# Patient Record
Sex: FEMALE | Race: WHITE | NOT HISPANIC OR LATINO | Employment: FULL TIME | URBAN - METROPOLITAN AREA
[De-identification: names, ages, dates, MRNs, and addresses within clinical notes are randomized per-mention and may not be internally consistent; named-entity substitution may affect disease eponyms.]

---

## 2017-07-18 ENCOUNTER — OFFICE VISIT (OUTPATIENT)
Dept: RHEUMATOLOGY | Facility: CLINIC | Age: 52
End: 2017-07-18
Payer: COMMERCIAL

## 2017-07-18 VITALS
WEIGHT: 234.69 LBS | HEIGHT: 65 IN | BODY MASS INDEX: 39.1 KG/M2 | SYSTOLIC BLOOD PRESSURE: 128 MMHG | DIASTOLIC BLOOD PRESSURE: 84 MMHG

## 2017-07-18 DIAGNOSIS — M35.01 SJOGREN'S SYNDROME WITH KERATOCONJUNCTIVITIS SICCA: Primary | ICD-10-CM

## 2017-07-18 PROCEDURE — 99213 OFFICE O/P EST LOW 20 MIN: CPT | Mod: ,,, | Performed by: INTERNAL MEDICINE

## 2017-07-18 RX ORDER — ERGOCALCIFEROL 1.25 MG/1
1000 CAPSULE ORAL DAILY
Status: ON HOLD | COMMUNITY
End: 2020-11-30

## 2017-07-18 RX ORDER — CEVIMELINE HYDROCHLORIDE 30 MG/1
30 CAPSULE ORAL DAILY
Refills: 5 | COMMUNITY
Start: 2017-06-30 | End: 2018-02-19

## 2017-07-18 NOTE — PROGRESS NOTES
"       [facility] RHEUMATOLOGY            PROGRESS NOTE      Subjective:       Patient ID:   NAME: Rhiannon Rangel : 1965     52 y.o. female    Referring Doc: No ref. provider found  Other Physicians:    Chief Complaint:  sjorens syndrome      History of Present Illness:     Patient returns today for a regularly scheduled follow-up visit for  Sjogren's syndrome     The patient is doing well except for severe dryness of her eyes and dryness of her mouth. She has tried eyedrops and Evoxac with only mild relief. She has also tried tear ducts plugs again with minimal improvement.  No rashes, no mucosal ulcerations, no chest pains cough or shortness of breath  No paresthesias  or joint swelling              ROS:   GEN:  No  fever, night sweats . weight is stable   No fatigue  SKIN: no rashes, no bruising, no ulcerations, no Raynaud's  HEENT: no HA's, No visual changes, no mucosal ulcers, ++sicca symptoms,  CV:   no CP, SOB, PND, BATISTA, no orthopnea, no palpitations  PULM: normal with no SOB, cough, hemoptysis, sputum or pleuritic pain  GI:  no abdominal pain, nausea, vomiting, constipation, diarrhea, melanotic stools, BRBPR, hematemesis, no dysphagia  :   no dysuria  NEURO: no paresthesias, headaches, visual disturbances, muscle weakness  MUSCULOSKELETAL:no joint swelling, prolonged AM stiffness, no back pain, no muscle pain  Allergies:  Review of patient's allergies indicates:  No Known Allergies    Medications:    Current Outpatient Prescriptions:     cevimeline (EVOXAC) 30 mg capsule, Take 30 mg by mouth once daily., Disp: , Rfl: 5    ergocalciferol (ERGOCALCIFEROL) 50,000 unit Cap, Take by mouth., Disp: , Rfl:     pumpkin seed extract-soy germ (AZO BLADDER CONTROL) 300 mg Cap, Take 300 mg by mouth once daily., Disp: , Rfl:     PMHx/PSHx Updates:            Objective:     Vitals:  Blood pressure 128/84, height 5' 5" (1.651 m), weight 106.5 kg (234 lb 11.2 oz).    Physical Examination:   GEN: no apparent " distress, comfortable; AAOx3  SKIN: no rashes,no ulceration, no Raynaud's, no petechiae, no SQ nodules,  HEAD: normal  EYES: no pallor, no icterus,   ENT:  ,+ mucosal dryness, no ulcerations  NECK: no masses, thyroid normal, trachea midline, no LAD/LN's, supple  CV: RRR with no murmur; l S1 and S2 reg. ,no gallop no rubs,   CHEST: Normal respiratory effort; CTAB; normal breath sounds; no wheeze or crackles  ABDOM: nontender and nondistended; soft; no masses; no rebound/guarding  MUSC/Skeletal: ROM normal; no crepitus; joints without synovitis,  no deformities  No joint swelling or tenderness of PIP, MCP, wrist, elbow, shoulder, or knee joints  EXTREM: no clubbing, cyanosis, no edema,normal  pulses   NEURO: grossly intact; motor WNL; AAOx3; ,   PSYCH: normal mood, affect and behavior  LYMPH: normal cervical, supraclavicular          Labs:   No results found for: WBC, HGB, HCT, MCV, PLT CMP  @LASTLAB(NA,K,CL,CO2,GLU,BUN,Creatinine,Calcium,PROT,Albumin,Bilitot,Alkphos,AST,ALT,CRP,ESR,RF,CCP,MAIRA,SSA,CPK,uric acid) )@  I have reviewed all available lab results and radiology reports.    Radiology/Diagnostic Studies:        Assessment/Plan:   (1) 52 y.o. female with diagnosis of Sjogren's with severe sicca sxs.  Try Aquoral  See dentist a few times a yr  Request results of recent labs done by PCP                Discussion:     I have explained all of the above in detail and the patient understands all of the current recommendation(s). I have answered all questions to the best of my ability and to their complete satisfaction.       The patient is to continue with the current management plan         RTC in   3-4 months      Electronically signed by Michelle Mendez MD

## 2018-02-19 ENCOUNTER — OFFICE VISIT (OUTPATIENT)
Dept: RHEUMATOLOGY | Facility: CLINIC | Age: 53
End: 2018-02-19
Payer: COMMERCIAL

## 2018-02-19 VITALS
DIASTOLIC BLOOD PRESSURE: 84 MMHG | SYSTOLIC BLOOD PRESSURE: 134 MMHG | HEIGHT: 65 IN | BODY MASS INDEX: 39.82 KG/M2 | WEIGHT: 239 LBS

## 2018-02-19 DIAGNOSIS — M35.01 SJOGREN'S SYNDROME WITH KERATOCONJUNCTIVITIS SICCA: Primary | ICD-10-CM

## 2018-02-19 PROCEDURE — 99213 OFFICE O/P EST LOW 20 MIN: CPT | Mod: ,,, | Performed by: INTERNAL MEDICINE

## 2018-02-19 PROCEDURE — 3008F BODY MASS INDEX DOCD: CPT | Mod: ,,, | Performed by: INTERNAL MEDICINE

## 2018-02-19 RX ORDER — DICLOFENAC SODIUM 10 MG/G
2 GEL TOPICAL 4 TIMES DAILY
Qty: 1 TUBE | Refills: 3 | Status: SHIPPED | OUTPATIENT
Start: 2018-02-19 | End: 2019-06-04 | Stop reason: SDUPTHER

## 2018-02-19 RX ORDER — ESCITALOPRAM OXALATE 20 MG/1
10 TABLET ORAL DAILY
Refills: 4 | COMMUNITY
Start: 2018-02-08

## 2018-02-19 NOTE — PROGRESS NOTES
"       [facility] RHEUMATOLOGY            PROGRESS NOTE      Subjective:       Patient ID:   NAME: Rhiannon Rangel : 1965     52 y.o. female    Referring Doc: No ref. provider found  Other Physicians:    Chief Complaint:  No chief complaint on file.      History of Present Illness:     Patient returns today for a regularly scheduled follow-up visit for  Sjogren's syndrome.     The patient is doing well. No chest pains cough or shortness of breath. No skin rashes. No mucosal ulcerations. No paresthesias. No joint swelling but some pain on the left knee after walking. Pain is in the medial aspect of the knee. No joint swelling.            ROS:   GEN:  No  fever, night sweats . weight is stable   No fatigue  SKIN: no rashes, no bruising, no ulcerations, no Raynaud's  HEENT: no HA's, No visual changes, no mucosal ulcers, + sicca symptoms,  CV:   no CP, SOB, PND, BATISTA, no orthopnea, no palpitations  PULM: normal with no SOB, cough, hemoptysis, sputum or pleuritic pain  GI:  no abdominal pain, nausea, vomiting, constipation, diarrhea, melanotic stools, BRBPR, hematemesis, no dysphagia  :   no dysuria  NEURO: no paresthesias, headaches, visual disturbances, muscle weakness  MUSCULOSKELETAL:no joint swelling, prolonged AM stiffness, no back pain, no muscle pain  Allergies:  Review of patient's allergies indicates:  No Known Allergies    Medications:    Current Outpatient Prescriptions:     ergocalciferol (ERGOCALCIFEROL) 50,000 unit Cap, Take by mouth., Disp: , Rfl:     escitalopram oxalate (LEXAPRO) 20 MG tablet, , Disp: , Rfl: 4    PILOCARPINE HCL (SALAGEN, PILOCARPINE, ORAL), Take by mouth., Disp: , Rfl:     diclofenac sodium 1 % Gel, Apply 2 g topically 4 (four) times daily. Apply 4 gm on affected knee tid prn, Disp: 1 Tube, Rfl: 3    PMHx/PSHx Updates:    Objective:     Vitals:  Blood pressure 134/84, height 5' 5" (1.651 m), weight 108.4 kg (239 lb).    Physical Examination:   GEN: no apparent distress, " comfortable; AAOx3  SKIN: no rashes,no ulceration, no Raynaud's, no petechiae, no SQ nodules,  HEAD: normal  EYES: no pallor, no icterus,  NECK: no masses, thyroid normal, trachea midline, no LAD/LN's, supple  CV: RRR with no murmur; l S1 and S2 reg. ,no gallop no rubs,   CHEST: Normal respiratory effort; CTAB; normal breath sounds; no wheeze or crackles  MUSC/Skeletal: ROM normal; no crepitus; joints without synovitis,  no deformities  No joint swelling or tenderness of PIP, MCP, wrist, elbow, shoulder, or knee joints. Tender left anserine bursa and infrapatellar tendon.  EXTREM: no clubbing, cyanosis, no edema,normal  pulses   NEURO: grossly intact; motor WNL; AAOx3;   PSYCH: normal mood, affect and behavior  LYMPH: normal cervical, supraclavicular          Labs:   No results found for: WBC, HGB, HCT, MCV, PLT CMP  @LASTLAB(NA,K,CL,CO2,GLU,BUN,Creatinine,Calcium,PROT,Albumin,Bilitot,Alkphos,AST,ALT,CRP,ESR,RF,CCP,MAIRA,SSA,CPK,uric acid) )@  I have reviewed all available lab results and radiology reports.    Radiology/Diagnostic Studies:        Assessment/Plan:   (1) 52 y.o. female with diagnosis of Sjogren's syndrome. This is stable.  2) mild left anserine bursitis and patellar tendinitis. She will use Voltaren gel as needed and Advil up to 800 mg 3 times a day after meals when necessary. She will call in a week or so for injection if not better.                Discussion:     I have explained all of the above in detail and the patient understands all of the current recommendation(s). I have answered all questions to the best of my ability and to their complete satisfaction.       The patient is to continue with the current management plan         RTC in   4 months or before if needed      Electronically signed by Michelle Mendez MD

## 2018-06-05 LAB
ALBUMIN SERPL-MCNC: 4.2 G/DL (ref 3.6–5.1)
ALBUMIN/GLOB SERPL: 1.6 (CALC) (ref 1–2.5)
ALP SERPL-CCNC: 137 U/L (ref 33–130)
ALT SERPL-CCNC: 17 U/L (ref 6–29)
AST SERPL-CCNC: 17 U/L (ref 10–35)
BASOPHILS # BLD AUTO: 29 CELLS/UL (ref 0–200)
BASOPHILS NFR BLD AUTO: 0.5 %
BILIRUB SERPL-MCNC: 0.6 MG/DL (ref 0.2–1.2)
BUN SERPL-MCNC: 20 MG/DL (ref 7–25)
BUN/CREAT SERPL: ABNORMAL (CALC) (ref 6–22)
CALCIUM SERPL-MCNC: 9.2 MG/DL (ref 8.6–10.4)
CHLORIDE SERPL-SCNC: 105 MMOL/L (ref 98–110)
CO2 SERPL-SCNC: 26 MMOL/L (ref 20–31)
COPY(IES) SENT TO:: NORMAL
CREAT SERPL-MCNC: 0.62 MG/DL (ref 0.5–1.05)
CRP SERPL-MCNC: 20.6 MG/L
EOSINOPHIL # BLD AUTO: 58 CELLS/UL (ref 15–500)
EOSINOPHIL NFR BLD AUTO: 1 %
ERYTHROCYTE [DISTWIDTH] IN BLOOD BY AUTOMATED COUNT: 14.1 % (ref 11–15)
GFR SERPL CREATININE-BSD FRML MDRD: 103 ML/MIN/1.73M2
GLOBULIN SER CALC-MCNC: 2.6 G/DL (CALC) (ref 1.9–3.7)
GLUCOSE SERPL-MCNC: 109 MG/DL (ref 65–99)
HCT VFR BLD AUTO: 38.9 % (ref 35–45)
HGB BLD-MCNC: 12.8 G/DL (ref 11.7–15.5)
LYMPHOCYTES # BLD AUTO: 1972 CELLS/UL (ref 850–3900)
LYMPHOCYTES NFR BLD AUTO: 34 %
MCH RBC QN AUTO: 27.9 PG (ref 27–33)
MCHC RBC AUTO-ENTMCNC: 32.9 G/DL (ref 32–36)
MCV RBC AUTO: 84.7 FL (ref 80–100)
MONOCYTES # BLD AUTO: 383 CELLS/UL (ref 200–950)
MONOCYTES NFR BLD AUTO: 6.6 %
NEUTROPHILS # BLD AUTO: 3358 CELLS/UL (ref 1500–7800)
NEUTROPHILS NFR BLD AUTO: 57.9 %
PLATELET # BLD AUTO: 282 THOUSAND/UL (ref 140–400)
PMV BLD REES-ECKER: 9.5 FL (ref 7.5–12.5)
POTASSIUM SERPL-SCNC: 4.1 MMOL/L (ref 3.5–5.3)
PROT SERPL-MCNC: 6.8 G/DL (ref 6.1–8.1)
RBC # BLD AUTO: 4.59 MILLION/UL (ref 3.8–5.1)
SODIUM SERPL-SCNC: 139 MMOL/L (ref 135–146)
WBC # BLD AUTO: 5.8 THOUSAND/UL (ref 3.8–10.8)

## 2018-10-01 ENCOUNTER — OFFICE VISIT (OUTPATIENT)
Dept: RHEUMATOLOGY | Facility: CLINIC | Age: 53
End: 2018-10-01
Payer: COMMERCIAL

## 2018-10-01 VITALS
BODY MASS INDEX: 40.47 KG/M2 | SYSTOLIC BLOOD PRESSURE: 94 MMHG | HEART RATE: 76 BPM | WEIGHT: 243.19 LBS | DIASTOLIC BLOOD PRESSURE: 69 MMHG

## 2018-10-01 DIAGNOSIS — M35.01 SJOGREN'S SYNDROME WITH KERATOCONJUNCTIVITIS SICCA: Primary | ICD-10-CM

## 2018-10-01 PROCEDURE — 99213 OFFICE O/P EST LOW 20 MIN: CPT | Mod: ,,, | Performed by: INTERNAL MEDICINE

## 2018-10-01 PROCEDURE — 3008F BODY MASS INDEX DOCD: CPT | Mod: ,,, | Performed by: INTERNAL MEDICINE

## 2018-10-01 RX ORDER — AMLODIPINE BESYLATE 10 MG/1
10 TABLET ORAL DAILY
Refills: 0 | COMMUNITY
Start: 2018-09-20 | End: 2021-05-27

## 2018-10-01 NOTE — PROGRESS NOTES
[facility] RHEUMATOLOGY            PROGRESS NOTE      Subjective:       Patient ID:   NAME: Rhiannon Rangel : 1965     53 y.o. female    Referring Doc: No ref. provider found  Other Physicians:    Chief Complaint:  Sjogren's syndrome      History of Present Illness:     Patient returns today for a regularly scheduled follow-up visit for Sjogren's syndrome      The patientis doing well. Occasional fatigue and arthralgias. No joint swelling. No rashes. No fevers. No cough or shortness of breath.  o chest pains          ROS:   GEN:  No  fever, night sweats . weight is stable   No fatigue  SKIN: no rashes, no bruising, no ulcerations, no Raynaud's  HEENT: no HA's, No visual changes, no mucosal ulcers, no sicca symptoms,  CV:   no CP, SOB, PND, BATISTA, no orthopnea, no palpitations  PULM: normal with no SOB, cough, hemoptysis, sputum or pleuritic pain  GI:  no abdominal pain, nausea, vomiting, constipation, diarrhea, melanotic stools, BRBPR, hematemesis, no dysphagia  :   no dysuria  NEURO: no paresthesias, headaches, visual disturbances, muscle weakness  MUSCULOSKELETAL:no joint swelling, prolonged AM stiffness, no back pain, + muscle pain  Allergies:  Review of patient's allergies indicates:  No Known Allergies    Medications:    Current Outpatient Medications:     amLODIPine (NORVASC) 10 MG tablet, , Disp: , Rfl: 0    ergocalciferol (ERGOCALCIFEROL) 50,000 unit Cap, Take by mouth., Disp: , Rfl:     escitalopram oxalate (LEXAPRO) 20 MG tablet, , Disp: , Rfl: 4    PILOCARPINE HCL (SALAGEN, PILOCARPINE, ORAL), Take by mouth., Disp: , Rfl:     diclofenac sodium 1 % Gel, Apply 2 g topically 4 (four) times daily. Apply 4 gm on affected knee tid prn, Disp: 1 Tube, Rfl: 3    PMHx/PSHx Updates:        Objective:     Vitals:  Blood pressure 94/69, pulse 76, weight 110.3 kg (243 lb 3.2 oz).    Physical Examination:   GEN: no apparent distress, comfortable; AAOx3  SKIN: no rashes,no ulceration, no Raynaud's, no  petechiae, no SQ nodules,  HEAD: normal  EYES: no pallor, no icterus  NECK: no masses, thyroid normal, trachea midline, no LAD/LN's, supple  CV: RRR with no murmur; l S1 and S2 reg. ,no gallop no rubs,   CHEST: Normal respiratory effort; CTAB; normal breath sounds; no wheeze or crackles  MUSC/Skeletal: ROM normal; no crepitus; joints without synovitis,  no deformities  No joint swelling or tenderness of PIP, MCP, wrist, elbow, shoulder, or knee joints  EXTREM: no clubbing, cyanosis, no edema,normal  pulses   NEURO: grossly intact; motor WNL; AAOx3; ,   PSYCH: normal mood, affect and behavior  LYMPH: normal cervical, supraclavicular          Labs:   Lab Results   Component Value Date    WBC 5.8 06/04/2018    HGB 12.8 06/04/2018    HCT 38.9 06/04/2018    MCV 84.7 06/04/2018     06/04/2018    CMP  @LASTLAB(NA,K,CL,CO2,GLU,BUN,Creatinine,Calcium,PROT,Albumin,Bilitot,Alkphos,AST,ALT,CRP,ESR,RF,CCP,MAIRA,SSA,CPK,uric acid) )@  I have reviewed all available lab results and radiology reports.    Radiology/Diagnostic Studies:    CBC CMP within normal limits C-reactive protein elevated back in June.    Assessment/Plan:   (1) 53 y.o. female with diagnosis of Sjogren's syndrome        CBC CMP CRP urinalysis        Discussion:     I have explained all of the above in detail and the patient understands all of the current recommendation(s). I have answered all questions to the best of my ability and to their complete satisfaction.       The patient is to continue with the current management plan         RTC in 4 months or before if needed        Electronically signed by Michelle Mendez MD

## 2018-10-03 LAB
ALBUMIN SERPL-MCNC: 4 G/DL (ref 3.6–5.1)
ALBUMIN/GLOB SERPL: 1.7 (CALC) (ref 1–2.5)
ALP SERPL-CCNC: 139 U/L (ref 33–130)
ALT SERPL-CCNC: 26 U/L (ref 6–29)
APPEARANCE UR: CLEAR
AST SERPL-CCNC: 17 U/L (ref 10–35)
BACTERIA #/AREA URNS HPF: ABNORMAL /HPF
BACTERIA UR CULT: NORMAL
BASOPHILS # BLD AUTO: 28 CELLS/UL (ref 0–200)
BASOPHILS NFR BLD AUTO: 0.4 %
BILIRUB SERPL-MCNC: 0.4 MG/DL (ref 0.2–1.2)
BILIRUB UR QL STRIP: NEGATIVE
BUN SERPL-MCNC: 20 MG/DL (ref 7–25)
BUN/CREAT SERPL: ABNORMAL (CALC) (ref 6–22)
CALCIUM SERPL-MCNC: 8.8 MG/DL (ref 8.6–10.4)
CHLORIDE SERPL-SCNC: 105 MMOL/L (ref 98–110)
CO2 SERPL-SCNC: 28 MMOL/L (ref 20–32)
COLOR UR: YELLOW
CREAT SERPL-MCNC: 0.62 MG/DL (ref 0.5–1.05)
CRP SERPL-MCNC: 19.3 MG/L
EOSINOPHIL # BLD AUTO: 71 CELLS/UL (ref 15–500)
EOSINOPHIL NFR BLD AUTO: 1 %
ERYTHROCYTE [DISTWIDTH] IN BLOOD BY AUTOMATED COUNT: 14.6 % (ref 11–15)
GFR SERPL CREATININE-BSD FRML MDRD: 102 ML/MIN/1.73M2
GLOBULIN SER CALC-MCNC: 2.4 G/DL (CALC) (ref 1.9–3.7)
GLUCOSE SERPL-MCNC: 133 MG/DL (ref 65–139)
GLUCOSE UR QL STRIP: NEGATIVE
HCT VFR BLD AUTO: 35.6 % (ref 35–45)
HGB BLD-MCNC: 12 G/DL (ref 11.7–15.5)
HGB UR QL STRIP: NEGATIVE
HYALINE CASTS #/AREA URNS LPF: ABNORMAL /LPF
KETONES UR QL STRIP: ABNORMAL
LEUKOCYTE ESTERASE UR QL STRIP: NEGATIVE
LYMPHOCYTES # BLD AUTO: 2080 CELLS/UL (ref 850–3900)
LYMPHOCYTES NFR BLD AUTO: 29.3 %
MCH RBC QN AUTO: 28.7 PG (ref 27–33)
MCHC RBC AUTO-ENTMCNC: 33.7 G/DL (ref 32–36)
MCV RBC AUTO: 85.2 FL (ref 80–100)
MONOCYTES # BLD AUTO: 348 CELLS/UL (ref 200–950)
MONOCYTES NFR BLD AUTO: 4.9 %
NEUTROPHILS # BLD AUTO: 4572 CELLS/UL (ref 1500–7800)
NEUTROPHILS NFR BLD AUTO: 64.4 %
NITRITE UR QL STRIP: NEGATIVE
PH UR STRIP: 7.5 [PH] (ref 5–8)
PLATELET # BLD AUTO: 270 THOUSAND/UL (ref 140–400)
PMV BLD REES-ECKER: 10.3 FL (ref 7.5–12.5)
POTASSIUM SERPL-SCNC: 4.1 MMOL/L (ref 3.5–5.3)
PROT SERPL-MCNC: 6.4 G/DL (ref 6.1–8.1)
PROT UR QL STRIP: NEGATIVE
RBC # BLD AUTO: 4.18 MILLION/UL (ref 3.8–5.1)
RBC #/AREA URNS HPF: ABNORMAL /HPF
SODIUM SERPL-SCNC: 141 MMOL/L (ref 135–146)
SP GR UR STRIP: 1.02 (ref 1–1.03)
SQUAMOUS #/AREA URNS HPF: ABNORMAL /HPF
WBC # BLD AUTO: 7.1 THOUSAND/UL (ref 3.8–10.8)
WBC #/AREA URNS HPF: ABNORMAL /HPF

## 2018-10-04 DIAGNOSIS — R74.8 ELEVATED ALKALINE PHOSPHATASE LEVEL: Primary | ICD-10-CM

## 2018-10-04 NOTE — PROGRESS NOTES
Patient notified of result and need to do alk phos with isoenzymes. Lab ordered thru [facility] . Order faxed to Dr. Lock in [place]

## 2018-10-09 LAB
ALP BONE CFR SERPL: 46 % (ref 28–66)
ALP INTEST CFR SERPL: 0 % (ref 1–24)
ALP LIVER CFR SERPL: 54 % (ref 25–69)
ALP PLAC CFR SERPL: 0 %
ALP SERPL-CCNC: 142 U/L (ref 33–130)

## 2019-06-04 ENCOUNTER — CLINICAL SUPPORT (OUTPATIENT)
Dept: INTERNAL MEDICINE | Facility: CLINIC | Age: 54
End: 2019-06-04
Payer: COMMERCIAL

## 2019-06-04 ENCOUNTER — OFFICE VISIT (OUTPATIENT)
Dept: RHEUMATOLOGY | Facility: CLINIC | Age: 54
End: 2019-06-04
Payer: COMMERCIAL

## 2019-06-04 VITALS
BODY MASS INDEX: 40.97 KG/M2 | SYSTOLIC BLOOD PRESSURE: 137 MMHG | WEIGHT: 246.19 LBS | DIASTOLIC BLOOD PRESSURE: 84 MMHG

## 2019-06-04 VITALS
WEIGHT: 246 LBS | BODY MASS INDEX: 40.98 KG/M2 | HEART RATE: 77 BPM | SYSTOLIC BLOOD PRESSURE: 136 MMHG | HEIGHT: 65 IN | DIASTOLIC BLOOD PRESSURE: 88 MMHG | OXYGEN SATURATION: 98 %

## 2019-06-04 DIAGNOSIS — Z02.89 ENCOUNTER FOR EXAMINATION REQUIRED BY DEPARTMENT OF TRANSPORTATION (DOT): Primary | ICD-10-CM

## 2019-06-04 DIAGNOSIS — M15.9 OSTEOARTHRITIS, GENERALIZED: Primary | ICD-10-CM

## 2019-06-04 LAB
BILIRUB SERPL-MCNC: ABNORMAL MG/DL
BLOOD, POC UA: ABNORMAL
GLUCOSE UR QL STRIP: ABNORMAL
KETONES UR QL STRIP: ABNORMAL
LEUKOCYTE ESTERASE URINE, POC: ABNORMAL
NITRITE, POC UA: ABNORMAL
PH, POC UA: ABNORMAL
PROTEIN, POC: ABNORMAL
SPECIFIC GRAVITY, POC UA: 1.02
UROBILINOGEN, POC UA: ABNORMAL

## 2019-06-04 PROCEDURE — 81000 POCT URINALYSIS: ICD-10-PCS | Mod: ,,, | Performed by: NURSE PRACTITIONER

## 2019-06-04 PROCEDURE — 99499 UNLISTED E&M SERVICE: CPT | Mod: ,,, | Performed by: NURSE PRACTITIONER

## 2019-06-04 PROCEDURE — 3008F BODY MASS INDEX DOCD: CPT | Mod: ,,, | Performed by: INTERNAL MEDICINE

## 2019-06-04 PROCEDURE — 99213 OFFICE O/P EST LOW 20 MIN: CPT | Mod: ,,, | Performed by: INTERNAL MEDICINE

## 2019-06-04 PROCEDURE — 81000 URINALYSIS NONAUTO W/SCOPE: CPT | Mod: ,,, | Performed by: NURSE PRACTITIONER

## 2019-06-04 PROCEDURE — 99213 PR OFFICE/OUTPT VISIT, EST, LEVL III, 20-29 MIN: ICD-10-PCS | Mod: ,,, | Performed by: INTERNAL MEDICINE

## 2019-06-04 PROCEDURE — 99499 PR PHYSICAL - DOT/CDL: ICD-10-PCS | Mod: ,,, | Performed by: NURSE PRACTITIONER

## 2019-06-04 PROCEDURE — 3008F PR BODY MASS INDEX (BMI) DOCUMENTED: ICD-10-PCS | Mod: ,,, | Performed by: INTERNAL MEDICINE

## 2019-06-04 RX ORDER — CHOLECALCIFEROL (VITAMIN D3) 25 MCG
TABLET ORAL
COMMUNITY

## 2019-06-04 RX ORDER — CEVIMELINE HYDROCHLORIDE 30 MG/1
30 CAPSULE ORAL 4 TIMES DAILY
Refills: 1 | COMMUNITY
Start: 2019-04-09

## 2019-06-04 RX ORDER — OMEPRAZOLE 20 MG/1
20 CAPSULE, DELAYED RELEASE ORAL DAILY
Refills: 2 | COMMUNITY
Start: 2019-05-12

## 2019-06-04 RX ORDER — CYCLOSPORINE 0.5 MG/ML
EMULSION OPHTHALMIC
Refills: 3 | COMMUNITY
Start: 2019-04-08

## 2019-06-04 RX ORDER — DICLOFENAC SODIUM 10 MG/G
2 GEL TOPICAL 4 TIMES DAILY
Qty: 1 TUBE | Refills: 3 | Status: SHIPPED | OUTPATIENT
Start: 2019-06-04 | End: 2021-07-06

## 2019-06-04 NOTE — PROGRESS NOTES
Subjective:       Patient ID: Rhiannon Rangel is a 53 y.o. female.    Chief Complaint: Employment Physical (dot physical)    Rhiannon Rangel is a 53 year old female who presents to the clinic today for a DOT exam. She is a . Today, she voices no questions/concerns. She denies tobacco use, alcohol or drug use. Medications reviewed with patient. Sees Dr Lock for PCP. Reports hx of hypertension. In regards to the patient's hypertension, patient denies chest pain/sob, and has been compliant with the medication regimen. Bp well controlled. Goal < 140/90 per DOT guidelines.  Denies cp, sob, n/v/d, headache or any pain today.       Review of Systems   Constitutional: Negative for activity change, chills, fatigue, fever and unexpected weight change.   HENT: Negative for congestion, ear pain, postnasal drip, sinus pressure, sneezing, sore throat and tinnitus.    Eyes: Negative for pain, redness and visual disturbance.        Wears glasses  Eye exam UTD   Respiratory: Negative for apnea, cough, chest tightness, shortness of breath and wheezing.    Cardiovascular: Negative for chest pain, palpitations and leg swelling.   Gastrointestinal: Negative for abdominal distention, abdominal pain, blood in stool, constipation, diarrhea, nausea and vomiting.   Endocrine: Negative for polydipsia, polyphagia and polyuria.   Genitourinary: Negative for difficulty urinating, dysuria, flank pain, frequency, hematuria and urgency.   Musculoskeletal: Negative for arthralgias, back pain, joint swelling and myalgias.   Skin: Negative for color change, pallor and rash.   Allergic/Immunologic: Negative for environmental allergies, food allergies and immunocompromised state.   Neurological: Negative for dizziness, tremors, syncope, weakness, light-headedness and headaches.   Hematological: Negative for adenopathy. Does not bruise/bleed easily.   Psychiatric/Behavioral: Negative for agitation, confusion, decreased  concentration, self-injury, sleep disturbance and suicidal ideas. The patient is not nervous/anxious and is not hyperactive.          Reviewed family, medical, surgical, and social history.    Objective:      There were no vitals taken for this visit.  Physical Exam   Constitutional: She is oriented to person, place, and time. She appears well-developed and well-nourished. She is active and cooperative. No distress.   HENT:   Head: Normocephalic and atraumatic.   Right Ear: Hearing, external ear and ear canal normal. No drainage. A middle ear effusion is present. No decreased hearing is noted.   Left Ear: Hearing, external ear and ear canal normal. No drainage. A middle ear effusion is present. No decreased hearing is noted.   Nose: Nose normal. No mucosal edema, rhinorrhea or nasal deformity. No epistaxis.   Mouth/Throat: Uvula is midline, oropharynx is clear and moist and mucous membranes are normal. No uvula swelling. No oropharyngeal exudate or posterior oropharyngeal erythema. No tonsillar exudate.   Eyes: Pupils are equal, round, and reactive to light. Conjunctivae, EOM and lids are normal. Right eye exhibits no discharge. Left eye exhibits no discharge.   Neck: Trachea normal and full passive range of motion without pain. No JVD present. No tracheal tenderness and no muscular tenderness present. Carotid bruit is not present. No edema and no erythema present. No thyroid mass and no thyromegaly present.   Cardiovascular: Normal rate, regular rhythm, S1 normal, S2 normal, normal heart sounds, intact distal pulses and normal pulses.   Pulmonary/Chest: Effort normal and breath sounds normal. No stridor. No tachypnea and no bradypnea. No respiratory distress. She has no decreased breath sounds. She has no wheezes. She has no rhonchi. She has no rales.   Abdominal: Soft. Normal appearance and bowel sounds are normal. She exhibits no distension and no abdominal bruit. There is no tenderness. There is no guarding and  no CVA tenderness.   Musculoskeletal: She exhibits no edema, tenderness or deformity.        Right foot: There is normal range of motion.        Left foot: There is normal range of motion.   Lymphadenopathy:     She has no cervical adenopathy.   Neurological: She is alert and oriented to person, place, and time. She has normal strength. She exhibits normal muscle tone.   Reflex Scores:       Patellar reflexes are 0 on the right side and 0 on the left side.  Skin: Skin is warm, dry and intact. Capillary refill takes less than 2 seconds. No abrasion, no laceration, no lesion and no rash noted. She is not diaphoretic. No cyanosis. Nails show no clubbing.   Psychiatric: She has a normal mood and affect. Her speech is normal and behavior is normal. Judgment and thought content normal. Cognition and memory are normal.       Assessment:       1. Encounter for examination required by Department of Transportation (DOT)        Plan:       Encounter for examination required by Department of Transportation (DOT)  -     POCT URINALYSIS          PLAN:  - Discussed with patient the plan of care  - UA, vision, hearing performed  - See scanned images  - Medications reviewed. Medication side effects discussed. Patient has no questions or concerns at this time. Informed patient to notify me regarding any concerns.   - Continue monitoring BP and documenting in log book   - Recommend to f/u regarding UA with PCP  - Informed patient to please notify me with any questions or concerns at anytime  - Follow up ordered for 1 Year      Risks, benefits, and side effects were discussed with the patient. All questions were answered to the fullest satisfaction of the patient, and pt verbalized understanding and agreement to treatment plan. Pt was to call with any new or worsening symptoms, or present to the ER.

## 2019-06-04 NOTE — PROGRESS NOTES
[facility] RHEUMATOLOGY            PROGRESS NOTE      Subjective:       Patient ID:   NAME: Rhiannon Rangel : 1965     53 y.o. female    Referring Doc: No ref. provider found  Other Physicians:    Chief Complaint:  Sjogren's syndrome (Needs refill on Diclofenac gel)      History of Present Illness:     Patient returns today for a regularly scheduled follow-up visit for Sjogren's syndrome    The patient continues with sicca  symptoms. No chest pains. Occasional nonproductive cough. No shortness of breath. No rashes, no paresthesias no joint swelling. No GI complaints          ROS:   GEN:  No  fever, night sweats . weight is stable   + occ fatigue  SKIN: no rashes, no bruising, no ulcerations, no Raynaud's  HEENT: no HA's, No visual changes, no mucosal ulcers, + sicca symptoms,  CV:   no CP, SOB, PND, BATISTA, no orthopnea, no palpitations  PULM: normal with no SOB, cough, hemoptysis, sputum or pleuritic pain  GI:  no abdominal pain, nausea, vomiting, constipation, diarrhea, melanotic stools, BRBPR, hematemesis, no dysphagia  :   no dysuria  NEURO: no paresthesias, headaches, visual disturbances, muscle weakness  MUSCULOSKELETAL:no joint swelling, prolonged AM stiffness, no back pain, no muscle pain  Allergies:  Review of patient's allergies indicates:  No Known Allergies    Medications:    Current Outpatient Medications:     amLODIPine (NORVASC) 10 MG tablet, , Disp: , Rfl: 0    cevimeline (EVOXAC) 30 mg capsule, TK 1 C PO TID, Disp: , Rfl: 1    diclofenac sodium (VOLTAREN) 1 % Gel, Apply 2 g topically 4 (four) times daily. Apply 4 gm on affected knee tid prn, Disp: 1 Tube, Rfl: 3    ergocalciferol (ERGOCALCIFEROL) 50,000 unit Cap, Take by mouth., Disp: , Rfl:     escitalopram oxalate (LEXAPRO) 20 MG tablet, , Disp: , Rfl: 4    omeprazole (PRILOSEC) 20 MG capsule, TK ONE C PO QD ONE HOUR BEFORE FIRST MEAL, Disp: , Rfl: 2    PILOCARPINE HCL (SALAGEN, PILOCARPINE, ORAL), Take by mouth., Disp: , Rfl:      RESTASIS 0.05 % ophthalmic emulsion, INT 1 GTT IN OU BID, Disp: , Rfl: 3    vitamin D (VITAMIN D3) 1000 units Tab, Vitamin D3 1,000 unit tablet  Take 1 tablet every day by oral route., Disp: , Rfl:     PMHx/PSHx Updates:        Objective:     Vitals:  Blood pressure 137/84, weight 111.7 kg (246 lb 3.2 oz).    Physical Examination:   GEN: no apparent distress, comfortable; AAOx3  SKIN: no rashes,no ulceration, no Raynaud's, no petechiae, no SQ nodules,  HEAD: normal  EYES: no pallor, no icterus,   NECK: no masses, thyroid normal, trachea midline, no LAD/LN's, supple  CV: RRR with no murmur; l S1 and S2 reg. ,no gallop no rubs,   CHEST: Normal respiratory effort; CTAB; normal breath sounds; no wheeze or crackles  MUSC/Skeletal: ROM normal; no crepitus; joints without synovitis,  no deformities  No joint swelling or tenderness of PIP, MCP, wrist, elbow, shoulder, or knee joints  EXTREM: no clubbing, cyanosis, no edema,normal  pulses   NEURO: grossly intact; motor WNL; AAOx3; ,   PSYCH: normal mood, affect and behavior  LYMPH: normal cervical, supraclavicular          Labs:   Lab Results   Component Value Date    WBC 7.1 10/02/2018    HGB 12.0 10/02/2018    HCT 35.6 10/02/2018    MCV 85.2 10/02/2018     10/02/2018    CMP  @LASTLAB(NA,K,CL,CO2,GLU,BUN,Creatinine,Calcium,PROT,Albumin,Bilitot,Alkphos,AST,ALT,CRP,ESR,RF,CCP,MAIRA,SSA,CPK,uric acid) )@  I have reviewed all available lab results and radiology reports.    Radiology/Diagnostic Studies:        Assessment/Plan:   (1) 53 y.o. female with diagnosis of Sjogren's syndrome. She has permanent tear duct.plugs. She uses Restasis and she is on Evoxac.  Despite all,  she still has significant dryness of her eyes. She has dryness of her mouth as well.She sees the dentist every 4 months and she is on Biotene mouthwashes , special tooth paste etc. No extraglandular manifestations of Sjogren's                Discussion:     I have explained all of the above in detail  and the patient understands all of the current recommendation(s). I have answered all questions to the best of my ability and to their complete satisfaction.       The patient is to continue with the current management plan         RTC in   4 months      Electronically signed by Michelle Mendez MD

## 2019-06-10 LAB
ALBUMIN SERPL-MCNC: 4.1 G/DL (ref 3.6–5.1)
ALBUMIN/GLOB SERPL: 1.8 (CALC) (ref 1–2.5)
ALP SERPL-CCNC: 157 U/L (ref 33–130)
ALT SERPL-CCNC: 19 U/L (ref 6–29)
APPEARANCE UR: CLEAR
AST SERPL-CCNC: 15 U/L (ref 10–35)
BACTERIA #/AREA URNS HPF: ABNORMAL /HPF
BACTERIA UR CULT: NORMAL
BASOPHILS # BLD AUTO: 33 CELLS/UL (ref 0–200)
BASOPHILS NFR BLD AUTO: 0.4 %
BILIRUB SERPL-MCNC: 0.3 MG/DL (ref 0.2–1.2)
BILIRUB UR QL STRIP: NEGATIVE
BUN SERPL-MCNC: 20 MG/DL (ref 7–25)
BUN/CREAT SERPL: ABNORMAL (CALC) (ref 6–22)
CALCIUM SERPL-MCNC: 9.2 MG/DL (ref 8.6–10.4)
CAOX CRY #/AREA URNS HPF: ABNORMAL /HPF
CHLORIDE SERPL-SCNC: 105 MMOL/L (ref 98–110)
CO2 SERPL-SCNC: 28 MMOL/L (ref 20–32)
COLOR UR: ABNORMAL
CREAT SERPL-MCNC: 0.65 MG/DL (ref 0.5–1.05)
CRP SERPL-MCNC: 17.8 MG/L
EOSINOPHIL # BLD AUTO: 82 CELLS/UL (ref 15–500)
EOSINOPHIL NFR BLD AUTO: 1 %
ERYTHROCYTE [DISTWIDTH] IN BLOOD BY AUTOMATED COUNT: 14.6 % (ref 11–15)
GFRSERPLBLD MDRD-ARVRAT: 101 ML/MIN/1.73M2
GLOBULIN SER CALC-MCNC: 2.3 G/DL (CALC) (ref 1.9–3.7)
GLUCOSE SERPL-MCNC: 95 MG/DL (ref 65–139)
GLUCOSE UR QL STRIP: NEGATIVE
GRAN CASTS #/AREA URNS LPF: ABNORMAL /LPF
HCT VFR BLD AUTO: 36.7 % (ref 35–45)
HGB BLD-MCNC: 12.2 G/DL (ref 11.7–15.5)
HGB UR QL STRIP: NEGATIVE
HYALINE CASTS #/AREA URNS LPF: ABNORMAL /LPF
KETONES UR QL STRIP: ABNORMAL
LEUKOCYTE ESTERASE UR QL STRIP: NEGATIVE
LYMPHOCYTES # BLD AUTO: 2091 CELLS/UL (ref 850–3900)
LYMPHOCYTES NFR BLD AUTO: 25.5 %
MCH RBC QN AUTO: 27.4 PG (ref 27–33)
MCHC RBC AUTO-ENTMCNC: 33.2 G/DL (ref 32–36)
MCV RBC AUTO: 82.3 FL (ref 80–100)
MONOCYTES # BLD AUTO: 525 CELLS/UL (ref 200–950)
MONOCYTES NFR BLD AUTO: 6.4 %
NEUTROPHILS # BLD AUTO: 5469 CELLS/UL (ref 1500–7800)
NEUTROPHILS NFR BLD AUTO: 66.7 %
NITRITE UR QL STRIP: NEGATIVE
PH UR STRIP: 5.5 [PH] (ref 5–8)
PLATELET # BLD AUTO: 299 THOUSAND/UL (ref 140–400)
PMV BLD REES-ECKER: 9.6 FL (ref 7.5–12.5)
POTASSIUM SERPL-SCNC: 4 MMOL/L (ref 3.5–5.3)
PROT SERPL-MCNC: 6.4 G/DL (ref 6.1–8.1)
PROT UR QL STRIP: NEGATIVE
RBC # BLD AUTO: 4.46 MILLION/UL (ref 3.8–5.1)
RBC #/AREA URNS HPF: ABNORMAL /HPF
SODIUM SERPL-SCNC: 141 MMOL/L (ref 135–146)
SP GR UR STRIP: 1.03 (ref 1–1.03)
SQUAMOUS #/AREA URNS HPF: ABNORMAL /HPF
WBC # BLD AUTO: 8.2 THOUSAND/UL (ref 3.8–10.8)
WBC #/AREA URNS HPF: ABNORMAL /HPF

## 2019-06-11 DIAGNOSIS — Z79.899 ENCOUNTER FOR LONG-TERM (CURRENT) USE OF MEDICATIONS: ICD-10-CM

## 2019-06-11 DIAGNOSIS — M15.9 OSTEOARTHRITIS, GENERALIZED: ICD-10-CM

## 2019-06-11 DIAGNOSIS — R74.8 ELEVATED ALKALINE PHOSPHATASE LEVEL: Primary | ICD-10-CM

## 2019-06-11 NOTE — PROGRESS NOTES
Patient notified of result and need for alk phos with isoenzymes test. Test ordered and faxed to Dr. Thomas office.

## 2019-06-22 LAB
ALP BONE CFR SERPL: 49 % (ref 28–66)
ALP INTEST CFR SERPL: 0 % (ref 1–24)
ALP LIVER CFR SERPL: 51 % (ref 25–69)
ALP PLAC CFR SERPL: 0 %
ALP SERPL-CCNC: 164 U/L (ref 33–130)

## 2019-10-09 ENCOUNTER — OFFICE VISIT (OUTPATIENT)
Dept: RHEUMATOLOGY | Facility: CLINIC | Age: 54
End: 2019-10-09
Payer: COMMERCIAL

## 2019-10-09 VITALS
BODY MASS INDEX: 40.82 KG/M2 | DIASTOLIC BLOOD PRESSURE: 89 MMHG | SYSTOLIC BLOOD PRESSURE: 136 MMHG | WEIGHT: 245.31 LBS

## 2019-10-09 DIAGNOSIS — R74.8 ELEVATED ALKALINE PHOSPHATASE LEVEL: Primary | ICD-10-CM

## 2019-10-09 DIAGNOSIS — M89.9 BONE DISEASE: ICD-10-CM

## 2019-10-09 PROCEDURE — 3008F PR BODY MASS INDEX (BMI) DOCUMENTED: ICD-10-PCS | Mod: S$GLB,,, | Performed by: INTERNAL MEDICINE

## 2019-10-09 PROCEDURE — 99213 OFFICE O/P EST LOW 20 MIN: CPT | Mod: S$GLB,,, | Performed by: INTERNAL MEDICINE

## 2019-10-09 PROCEDURE — 99213 PR OFFICE/OUTPT VISIT, EST, LEVL III, 20-29 MIN: ICD-10-PCS | Mod: S$GLB,,, | Performed by: INTERNAL MEDICINE

## 2019-10-09 PROCEDURE — 3008F BODY MASS INDEX DOCD: CPT | Mod: S$GLB,,, | Performed by: INTERNAL MEDICINE

## 2019-10-09 NOTE — PROGRESS NOTES
[facility] RHEUMATOLOGY            PROGRESS NOTE      Subjective:       Patient ID:   NAME: Rhiannon Rangel : 1965     54 y.o. female    Referring Doc: No ref. provider found  Other Physicians:    Chief Complaint:  Osteoarthritis      History of Present Illness:     Patient returns today for a regularly scheduled follow-up visit for  OA , Sjogren's    The patient is doing well.  No fevers, cough shortness of breath.  No rashes.  No paresthesias.  No GI complaints.  +Sicca          ROS:   GEN:  No  fever, night sweats . weight is stable   + fatigue  SKIN: no rashes, no bruising, no ulcerations, no Raynaud's  HEENT: no HA's, No visual changes, no mucosal ulcers, + sicca symptoms,  CV:   no CP, SOB, PND, BATISTA, no orthopnea, no palpitations  PULM: normal with no SOB, cough, hemoptysis, sputum or pleuritic pain  GI:  no abdominal pain, nausea, vomiting, constipation, diarrhea, melanotic stools, BRBPR, hematemesis, no dysphagia  :   no dysuria  NEURO: no paresthesias, headaches, visual disturbances, muscle weakness  MUSCULOSKELETAL:no joint swelling, prolonged AM stiffness, no back pain, no muscle pain  Allergies:  Review of patient's allergies indicates:  No Known Allergies    Medications:    Current Outpatient Medications:     amLODIPine (NORVASC) 10 MG tablet, , Disp: , Rfl: 0    cevimeline (EVOXAC) 30 mg capsule, TK 1 C PO TID, Disp: , Rfl: 1    diclofenac sodium (VOLTAREN) 1 % Gel, Apply 2 g topically 4 (four) times daily. Apply 4 gm on affected knee tid prn, Disp: 1 Tube, Rfl: 3    ergocalciferol (ERGOCALCIFEROL) 50,000 unit Cap, Take by mouth., Disp: , Rfl:     escitalopram oxalate (LEXAPRO) 20 MG tablet, , Disp: , Rfl: 4    omeprazole (PRILOSEC) 20 MG capsule, TK ONE C PO QD ONE HOUR BEFORE FIRST MEAL, Disp: , Rfl: 2    RESTASIS 0.05 % ophthalmic emulsion, INT 1 GTT IN OU BID, Disp: , Rfl: 3    vitamin D (VITAMIN D3) 1000 units Tab, Vitamin D3 1,000 unit tablet  Take 1 tablet every day by oral  route., Disp: , Rfl:     PMHx/PSHx Updates:          Objective:     Vitals:  Blood pressure 136/89, weight 111.3 kg (245 lb 4.8 oz).    Physical Examination:   GEN: no apparent distress, comfortable; AAOx3  SKIN: no rashes,no ulceration, no Raynaud's, no petechiae, no SQ nodules,  HEAD: normal  EYES: no pallor, no icterus, PERRLA  ENT:  ,no mucosal dryness or ulcerations  NECK: no masses, thyroid normal, trachea midline, no LAD/LN's, supple  CV: RRR with no murmur; l S1 and S2 reg. ,no gallop no rubs,   CHEST: Normal respiratory effort; CTAB; normal breath sounds; no wheeze or crackles  MUSC/Skeletal: ROM normal; no crepitus; joints without synovitis,  no deformities  No joint swelling or tenderness of PIP, MCP, wrist, elbow, shoulder, or knee joints  EXTREM: no clubbing, cyanosis, no edema,normal  pulses   NEURO: grossly intact; motor WNL; AAOx3; ,   PSYCH: normal mood, affect and behavior  LYMPH: normal cervical, supraclavicular          Labs:   Lab Results   Component Value Date    WBC 8.2 06/07/2019    HGB 12.2 06/07/2019    HCT 36.7 06/07/2019    MCV 82.3 06/07/2019     06/07/2019    CMP  @LASTLAB(NA,K,CL,CO2,GLU,BUN,Creatinine,Calcium,PROT,Albumin,Bilitot,Alkphos,AST,ALT,CRP,ESR,RF,CCP,MAIRA,SSA,CPK,uric acid) )@  I have reviewed all available lab results and radiology reports.    Radiology/Diagnostic Studies:        Assessment/Plan:   (1) 54 y.o. female with diagnosis of Sjogren's.  This is stable  Slight elevation of alkaline phosphatase with normal isoenzyme percentages   Plan: ultrasound of liver, if negative do total body bone scan      FU in 4 months  Had blood work with primary care yesterday.  She will request results be sent to me.        Discussion:     I have explained all of the above in detail and the patient understands all of the current recommendation(s). I have answered all questions to the best of my ability and to their complete satisfaction.       The patient is to continue with the  current management plan         RTC in         Electronically signed by Michelle Mendez MD

## 2019-10-30 ENCOUNTER — TELEPHONE (OUTPATIENT)
Dept: RHEUMATOLOGY | Facility: CLINIC | Age: 54
End: 2019-10-30

## 2019-10-30 NOTE — TELEPHONE ENCOUNTER
Patient notified of abdominal us results and instructed to fu with Dr. Lock. Patient verbalizes understanding of all.   result faxed to Dr. Lock.

## 2019-10-30 NOTE — TELEPHONE ENCOUNTER
----- Message from Michelle Mendez MD sent at 10/29/2019  9:53 AM CDT -----  US shows fatty infiltration of liver and ? r kidney changes.  Please send report to her PCP,Dr.D Lock in [place].FU with her

## 2020-03-13 DIAGNOSIS — R10.13 EPIGASTRIC ABDOMINAL PAIN: Primary | ICD-10-CM

## 2020-08-05 ENCOUNTER — OFFICE VISIT (OUTPATIENT)
Dept: PODIATRY | Facility: CLINIC | Age: 55
End: 2020-08-05
Payer: COMMERCIAL

## 2020-08-05 VITALS — TEMPERATURE: 100 F

## 2020-08-05 DIAGNOSIS — M72.2 PLANTAR FASCIITIS: Primary | ICD-10-CM

## 2020-08-05 DIAGNOSIS — M35.00 SJOGREN'S SYNDROME, WITH UNSPECIFIED ORGAN INVOLVEMENT: ICD-10-CM

## 2020-08-05 DIAGNOSIS — M79.2 NEURITIS: ICD-10-CM

## 2020-08-05 PROCEDURE — 99203 PR OFFICE/OUTPT VISIT, NEW, LEVL III, 30-44 MIN: ICD-10-PCS | Mod: S$GLB,,, | Performed by: PODIATRIST

## 2020-08-05 PROCEDURE — 99999 PR PBB SHADOW E&M-EST. PATIENT-LVL III: ICD-10-PCS | Mod: PBBFAC,,, | Performed by: PODIATRIST

## 2020-08-05 PROCEDURE — 99999 PR PBB SHADOW E&M-EST. PATIENT-LVL III: CPT | Mod: PBBFAC,,, | Performed by: PODIATRIST

## 2020-08-05 PROCEDURE — 99203 OFFICE O/P NEW LOW 30 MIN: CPT | Mod: S$GLB,,, | Performed by: PODIATRIST

## 2020-08-05 RX ORDER — IRBESARTAN 300 MG/1
300 TABLET ORAL DAILY
COMMUNITY

## 2020-08-05 RX ORDER — DICLOFENAC SODIUM 75 MG/1
75 TABLET, DELAYED RELEASE ORAL 2 TIMES DAILY
Qty: 60 TABLET | Refills: 1 | Status: SHIPPED | OUTPATIENT
Start: 2020-08-05 | End: 2020-09-04

## 2020-08-05 RX ORDER — MELOXICAM 7.5 MG/1
TABLET ORAL
COMMUNITY
Start: 2020-07-29 | End: 2020-08-05 | Stop reason: ALTCHOICE

## 2020-08-05 RX ORDER — MONTELUKAST SODIUM 10 MG/1
TABLET ORAL
COMMUNITY
Start: 2019-10-10 | End: 2020-11-25 | Stop reason: CLARIF

## 2020-08-05 RX ORDER — ALENDRONATE SODIUM 70 MG/1
70 TABLET ORAL
COMMUNITY

## 2020-08-05 RX ORDER — NAPROXEN 500 MG/1
TABLET ORAL
COMMUNITY
Start: 2019-08-29 | End: 2020-08-05

## 2020-08-05 RX ORDER — NITROFURANTOIN 25; 75 MG/1; MG/1
CAPSULE ORAL
COMMUNITY
End: 2020-11-25 | Stop reason: CLARIF

## 2020-08-05 RX ORDER — ONDANSETRON 4 MG/1
TABLET, ORALLY DISINTEGRATING ORAL
COMMUNITY
End: 2020-11-25 | Stop reason: CLARIF

## 2020-08-06 ENCOUNTER — TELEPHONE (OUTPATIENT)
Dept: PODIATRY | Facility: CLINIC | Age: 55
End: 2020-08-06

## 2020-08-06 NOTE — TELEPHONE ENCOUNTER
----- Message from Saad Gallardo DPM sent at 8/6/2020  8:11 AM CDT -----  Call patient and advise this is the rheumatologist that I would recommend her name is Dr. Onelia Eric she is located in [place] and we have used her before with very good results she does have a lot of knowledge with autoimmune diseases.  We can send a referral if she would like otherwise she is free to call and make an appointment on her own.  [phone]

## 2020-08-08 NOTE — PROGRESS NOTES
Subjective:       Patient ID: Rhiannon Rangel is a 55 y.o. female.    Chief Complaint: Heel Pain and Foot Problem    Patient presents today for a new patient evaluation patient was last seen in our home office almost 5 years ago.  Patient has a history of Sjogren's syndrome.  Patient is currently under the care of a rheumatologist.  Patient relates she has had pain in her right heel on an off she states she believes this all started when she was stopped wearing her power step arch supports that we had previously dispensed to her to start to wear cute shoes that is when her pain started.  Past Medical History:   Diagnosis Date    Sjogren's disease      Past Surgical History:   Procedure Laterality Date    SHOULDER SURGERY Left      Family History   Problem Relation Age of Onset    Cancer Father         Lung CA     Social History     Socioeconomic History    Marital status:      Spouse name: Not on file    Number of children: Not on file    Years of education: Not on file    Highest education level: Not on file   Occupational History    Not on file   Social Needs    Financial resource strain: Not on file    Food insecurity     Worry: Not on file     Inability: Not on file    Transportation needs     Medical: Not on file     Non-medical: Not on file   Tobacco Use    Smoking status: Never Smoker    Smokeless tobacco: Never Used   Substance and Sexual Activity    Alcohol use: No    Drug use: No    Sexual activity: Not on file   Lifestyle    Physical activity     Days per week: Not on file     Minutes per session: Not on file    Stress: Not on file   Relationships    Social connections     Talks on phone: Not on file     Gets together: Not on file     Attends [employer] service: Not on file     Active member of club or organization: Not on file     Attends meetings of clubs or organizations: Not on file     Relationship status: Not on file   Other Topics Concern    Not on file   Social History  Narrative    Not on file       Current Outpatient Medications   Medication Sig Dispense Refill    cevimeline (EVOXAC) 30 mg capsule TK 1 C PO TID  1    diclofenac sodium (VOLTAREN) 1 % Gel Apply 2 g topically 4 (four) times daily. Apply 4 gm on affected knee tid prn 1 Tube 3    ergocalciferol (ERGOCALCIFEROL) 50,000 unit Cap Take by mouth.      escitalopram oxalate (LEXAPRO) 20 MG tablet   4    irbesartan (AVAPRO) 300 MG tablet irbesartan 300 mg tablet      montelukast (SINGULAIR) 10 mg tablet montelukast 10 mg tablet      nitrofurantoin, macrocrystal-monohydrate, (MACROBID) 100 MG capsule Macrobid 100 mg capsule   Take 1 capsule every 12 hours by oral route for 7 days.      omeprazole (PRILOSEC) 20 MG capsule TK ONE C PO QD ONE HOUR BEFORE FIRST MEAL  2    ondansetron (ZOFRAN-ODT) 4 MG TbDL ondansetron 4 mg disintegrating tablet   Take 1 tablet every 12 hours by oral route as directed for 7 days.      RESTASIS 0.05 % ophthalmic emulsion INT 1 GTT IN OU BID  3    vitamin D (VITAMIN D3) 1000 units Tab Vitamin D3 1,000 unit tablet   Take 1 tablet every day by oral route.      alendronate (FOSAMAX) 70 MG tablet alendronate 70 mg tablet      amLODIPine (NORVASC) 10 MG tablet   0    diclofenac (VOLTAREN) 75 MG EC tablet Take 1 tablet (75 mg total) by mouth 2 (two) times daily. 60 tablet 1     No current facility-administered medications for this visit.      Review of patient's allergies indicates:  No Known Allergies    Review of Systems   Musculoskeletal: Positive for arthralgias and joint swelling.   Neurological: Positive for numbness.   All other systems reviewed and are negative.      Objective:      Vitals:    08/05/20 1606   Temp: 99.9 °F (37.7 °C)     Physical Exam  Vitals signs and nursing note reviewed.   Constitutional:       Appearance: Normal appearance. She is obese.   Cardiovascular:      Pulses:           Dorsalis pedis pulses are 2+ on the right side and 2+ on the left side.         Posterior tibial pulses are 1+ on the right side and 1+ on the left side.   Pulmonary:      Effort: Pulmonary effort is normal.   Musculoskeletal:         General: Swelling and tenderness present.        Feet:    Feet:      Right foot:      Protective Sensation: 3 sites tested. 1 site sensed.     Skin integrity: Erythema and warmth present.      Left foot:      Protective Sensation: 3 sites tested. 1 site sensed.  Skin:     General: Skin is warm.      Capillary Refill: Capillary refill takes 2 to 3 seconds.   Neurological:      Mental Status: She is alert.      Sensory: Sensory deficit present.   Psychiatric:         Mood and Affect: Mood normal.         Behavior: Behavior normal.         Thought Content: Thought content normal.         Judgment: Judgment normal.              Assessment:       1. Plantar fasciitis    2. Neuritis    3. Sjogren's syndrome, with unspecified organ involvement        Plan:       Patient presents today for a new patient evaluation patient was last seen in our home office almost 5 years ago.  Patient has a history of Sjogren's syndrome.  Patient is currently under the care of a rheumatologist.  Patient relates she has had pain in her right heel on an off she states she believes this all started when she was stopped wearing her power step arch supports that we had previously dispensed to her to start to wear cute shoes that is when her pain started.  Following evaluation patient has findings consistent with plantar fasciitis obviously she is not getting enough support that is what is causing her plantar fasciitis stopping the use of her power steps obviously flared up the plantar fasciitis and because she does not have enough support the plantar fascia has become inflamed and painful.  Patient does have signs of neuropathy bilateral.  Patient is currently taking Mobic 7.5 mg she states she is not sure why she is taking a low dose I have recommended changing her over to diclofenac I will  have her stop using the Mobic I have also recommended new power step supports additionally I have added blue arch padding to the plantar arch bilateral advising the patient she needs this extra support to address her plantar fasciitis.  Patient was understanding and agreement with this she will start wearing the power steps at all times I plan to see her only as needed if she is not significantly better over the next several weeks and she should be almost pain free she is going to contact me for further evaluation and treatment as needed.  Patient indicated today she feels that she needs to find a new rheumatologist the 1 that she is currently seeing she states she does not believe his listening to her problems in her complaints I did write him ache a recommendation and advised the patient I can certainly send a referral if she would like to Dr. Onelia Eric.  Patient was contacted and given this information she understands what she needs to do to address her plantar fasciitis and will discontinue any barefoot walking or shoes without her power steps.  Face-to-face time equaled 30 min.This note was created using [employer] voice recognition software that occasionally misinterpreted phrases or words.

## 2020-11-13 ENCOUNTER — HOSPITAL ENCOUNTER (OUTPATIENT)
Dept: RADIOLOGY | Facility: HOSPITAL | Age: 55
Discharge: HOME | End: 2020-11-13
Attending: SURGERY
Payer: COMMERCIAL

## 2020-11-13 DIAGNOSIS — R10.13 EPIGASTRIC ABDOMINAL PAIN: ICD-10-CM

## 2020-11-13 PROCEDURE — 76705 ECHO EXAM OF ABDOMEN: CPT | Mod: TC,PO

## 2020-11-23 ENCOUNTER — TELEPHONE (OUTPATIENT)
Dept: ORTHOPEDICS | Facility: CLINIC | Age: 55
End: 2020-11-23

## 2020-11-23 NOTE — TELEPHONE ENCOUNTER
Left message on voicemail for pt to call back. Need to know which foot pt having pain in. Also left message stating Dr. Swanson is foot and ankle and does not usually treat knee pain. Thanks. Delores

## 2020-11-25 ENCOUNTER — HOSPITAL ENCOUNTER (OUTPATIENT)
Dept: PREADMISSION TESTING | Facility: HOSPITAL | Age: 55
Discharge: HOME | End: 2020-11-25
Attending: SURGERY
Payer: COMMERCIAL

## 2020-11-25 ENCOUNTER — LAB VISIT (OUTPATIENT)
Dept: LAB | Facility: HOSPITAL | Age: 55
End: 2020-11-25
Attending: SURGERY
Payer: COMMERCIAL

## 2020-11-25 ENCOUNTER — HOSPITAL ENCOUNTER (OUTPATIENT)
Dept: RADIOLOGY | Facility: HOSPITAL | Age: 55
Discharge: HOME | End: 2020-11-25
Attending: SURGERY
Payer: COMMERCIAL

## 2020-11-25 VITALS
TEMPERATURE: 98 F | RESPIRATION RATE: 14 BRPM | BODY MASS INDEX: 39.67 KG/M2 | OXYGEN SATURATION: 98 % | DIASTOLIC BLOOD PRESSURE: 78 MMHG | HEART RATE: 84 BPM | WEIGHT: 238.13 LBS | HEIGHT: 65 IN | SYSTOLIC BLOOD PRESSURE: 130 MMHG

## 2020-11-25 DIAGNOSIS — Z01.818 PRE-OP TESTING: ICD-10-CM

## 2020-11-25 DIAGNOSIS — Z01.811 PRE-OP CHEST EXAM: Primary | ICD-10-CM

## 2020-11-25 DIAGNOSIS — Z01.811 PRE-OP CHEST EXAM: ICD-10-CM

## 2020-11-25 LAB
ALBUMIN SERPL BCP-MCNC: 4 G/DL (ref 3.5–5.2)
ALP SERPL-CCNC: 92 U/L (ref 55–135)
ALT SERPL W/O P-5'-P-CCNC: 33 U/L (ref 10–44)
ANION GAP SERPL CALC-SCNC: 7 MMOL/L (ref 8–16)
AST SERPL-CCNC: 29 U/L (ref 10–40)
BILIRUB SERPL-MCNC: 0.7 MG/DL (ref 0.1–1)
BUN SERPL-MCNC: 15 MG/DL (ref 6–20)
CALCIUM SERPL-MCNC: 8.7 MG/DL (ref 8.7–10.5)
CHLORIDE SERPL-SCNC: 107 MMOL/L (ref 95–110)
CO2 SERPL-SCNC: 28 MMOL/L (ref 23–29)
CREAT SERPL-MCNC: 0.6 MG/DL (ref 0.5–1.4)
ERYTHROCYTE [DISTWIDTH] IN BLOOD BY AUTOMATED COUNT: 14.6 % (ref 11.5–14.5)
EST. GFR  (AFRICAN AMERICAN): >60 ML/MIN/1.73 M^2
EST. GFR  (NON AFRICAN AMERICAN): >60 ML/MIN/1.73 M^2
GLUCOSE SERPL-MCNC: 107 MG/DL (ref 70–110)
HCT VFR BLD AUTO: 39.6 % (ref 37–48.5)
HGB BLD-MCNC: 12.5 G/DL (ref 12–16)
MCH RBC QN AUTO: 28.3 PG (ref 27–31)
MCHC RBC AUTO-ENTMCNC: 31.6 G/DL (ref 32–36)
MCV RBC AUTO: 90 FL (ref 82–98)
PLATELET # BLD AUTO: 276 K/UL (ref 150–350)
PMV BLD AUTO: 9.6 FL (ref 9.2–12.9)
POTASSIUM SERPL-SCNC: 4.1 MMOL/L (ref 3.5–5.1)
PROT SERPL-MCNC: 6.7 G/DL (ref 6–8.4)
RBC # BLD AUTO: 4.41 M/UL (ref 4–5.4)
SODIUM SERPL-SCNC: 142 MMOL/L (ref 136–145)
WBC # BLD AUTO: 6.35 K/UL (ref 3.9–12.7)

## 2020-11-25 PROCEDURE — 93010 ELECTROCARDIOGRAM REPORT: CPT | Mod: ,,, | Performed by: INTERNAL MEDICINE

## 2020-11-25 PROCEDURE — 85027 COMPLETE CBC AUTOMATED: CPT

## 2020-11-25 PROCEDURE — 93010 EKG 12-LEAD: ICD-10-PCS | Mod: ,,, | Performed by: INTERNAL MEDICINE

## 2020-11-25 PROCEDURE — 93005 ELECTROCARDIOGRAM TRACING: CPT | Performed by: INTERNAL MEDICINE

## 2020-11-25 PROCEDURE — 80053 COMPREHEN METABOLIC PANEL: CPT

## 2020-11-25 PROCEDURE — 71046 X-RAY EXAM CHEST 2 VIEWS: CPT | Mod: TC

## 2020-11-25 PROCEDURE — 36415 COLL VENOUS BLD VENIPUNCTURE: CPT

## 2020-11-25 RX ORDER — CEFAZOLIN SODIUM 2 G/50ML
2 SOLUTION INTRAVENOUS ONCE
Status: CANCELLED | OUTPATIENT
Start: 2020-11-30

## 2020-11-25 NOTE — CARE UPDATE
Covid test results received from [facility]-positive results dated 10/30/2020-scanned to patients record.

## 2020-11-25 NOTE — CARE UPDATE
Patient states had positive covid test 2 months ago. Explained must bring documented results from facility where test was done. Verbalizes understanding and requested fax number- given to patient.

## 2020-11-30 ENCOUNTER — HOSPITAL ENCOUNTER (OUTPATIENT)
Facility: HOSPITAL | Age: 55
Discharge: HOME | End: 2020-11-30
Attending: SURGERY | Admitting: SURGERY
Payer: COMMERCIAL

## 2020-11-30 ENCOUNTER — ANESTHESIA (OUTPATIENT)
Dept: SURGERY | Facility: HOSPITAL | Age: 55
End: 2020-11-30
Payer: COMMERCIAL

## 2020-11-30 ENCOUNTER — ANESTHESIA EVENT (OUTPATIENT)
Dept: SURGERY | Facility: HOSPITAL | Age: 55
End: 2020-11-30
Payer: COMMERCIAL

## 2020-11-30 VITALS
RESPIRATION RATE: 18 BRPM | BODY MASS INDEX: 39.67 KG/M2 | HEIGHT: 65 IN | TEMPERATURE: 98 F | SYSTOLIC BLOOD PRESSURE: 132 MMHG | DIASTOLIC BLOOD PRESSURE: 72 MMHG | WEIGHT: 238.13 LBS | OXYGEN SATURATION: 94 % | HEART RATE: 77 BPM

## 2020-11-30 DIAGNOSIS — K80.10 CALCULUS OF GALLBLADDER WITH CHRONIC CHOLECYSTITIS WITHOUT OBSTRUCTION: Primary | ICD-10-CM

## 2020-11-30 DIAGNOSIS — Z01.818 PRE-OP TESTING: ICD-10-CM

## 2020-11-30 LAB
BILIRUB UR QL STRIP: NEGATIVE
CLARITY UR: CLEAR
COLOR UR: YELLOW
GLUCOSE UR QL STRIP: NEGATIVE
HGB UR QL STRIP: NEGATIVE
KETONES UR QL STRIP: NEGATIVE
LEUKOCYTE ESTERASE UR QL STRIP: NEGATIVE
NITRITE UR QL STRIP: NEGATIVE
PH UR STRIP: 8 [PH] (ref 5–8)
PROT UR QL STRIP: ABNORMAL
SP GR UR STRIP: 1.02 (ref 1–1.03)
URN SPEC COLLECT METH UR: ABNORMAL
UROBILINOGEN UR STRIP-ACNC: NEGATIVE EU/DL

## 2020-11-30 PROCEDURE — 27000284 HC CANNULA NASAL: Performed by: STUDENT IN AN ORGANIZED HEALTH CARE EDUCATION/TRAINING PROGRAM

## 2020-11-30 PROCEDURE — 27000673 HC TUBING BLOOD Y: Performed by: STUDENT IN AN ORGANIZED HEALTH CARE EDUCATION/TRAINING PROGRAM

## 2020-11-30 PROCEDURE — 27000080 OPTIME MED/SURG SUP & DEVICES GENERAL CLASSIFICATION: Performed by: SURGERY

## 2020-11-30 PROCEDURE — 25000003 PHARM REV CODE 250: Performed by: NURSE ANESTHETIST, CERTIFIED REGISTERED

## 2020-11-30 PROCEDURE — 27201107 HC STYLET, STANDARD: Performed by: STUDENT IN AN ORGANIZED HEALTH CARE EDUCATION/TRAINING PROGRAM

## 2020-11-30 PROCEDURE — 71000033 HC RECOVERY, INTIAL HOUR: Performed by: SURGERY

## 2020-11-30 PROCEDURE — 37000008 HC ANESTHESIA 1ST 15 MINUTES: Performed by: SURGERY

## 2020-11-30 PROCEDURE — 71000039 HC RECOVERY, EACH ADD'L HOUR: Performed by: SURGERY

## 2020-11-30 PROCEDURE — 27202105 HC BIS BILATERAL SENSOR: Performed by: STUDENT IN AN ORGANIZED HEALTH CARE EDUCATION/TRAINING PROGRAM

## 2020-11-30 PROCEDURE — 71000015 HC POSTOP RECOV 1ST HR: Performed by: SURGERY

## 2020-11-30 PROCEDURE — 27000653 HC CATH, IV CATHLN: Performed by: STUDENT IN AN ORGANIZED HEALTH CARE EDUCATION/TRAINING PROGRAM

## 2020-11-30 PROCEDURE — 63600175 PHARM REV CODE 636 W HCPCS: Performed by: SURGERY

## 2020-11-30 PROCEDURE — 27000657 HC HEADREST, PRONE: Performed by: STUDENT IN AN ORGANIZED HEALTH CARE EDUCATION/TRAINING PROGRAM

## 2020-11-30 PROCEDURE — C9290 INJ, BUPIVACAINE LIPOSOME: HCPCS | Performed by: SURGERY

## 2020-11-30 PROCEDURE — 81003 URINALYSIS AUTO W/O SCOPE: CPT

## 2020-11-30 PROCEDURE — 25000003 PHARM REV CODE 250: Performed by: ANESTHESIOLOGY

## 2020-11-30 PROCEDURE — 25000003 PHARM REV CODE 250: Performed by: SURGERY

## 2020-11-30 PROCEDURE — 36000709 HC OR TIME LEV III EA ADD 15 MIN: Performed by: SURGERY

## 2020-11-30 PROCEDURE — 27201423 OPTIME MED/SURG SUP & DEVICES STERILE SUPPLY: Performed by: SURGERY

## 2020-11-30 PROCEDURE — 63600175 PHARM REV CODE 636 W HCPCS: Performed by: ANESTHESIOLOGY

## 2020-11-30 PROCEDURE — 27000671 HC TUBING MICROBORE EXT: Performed by: STUDENT IN AN ORGANIZED HEALTH CARE EDUCATION/TRAINING PROGRAM

## 2020-11-30 PROCEDURE — 36000708 HC OR TIME LEV III 1ST 15 MIN: Performed by: SURGERY

## 2020-11-30 PROCEDURE — 37000009 HC ANESTHESIA EA ADD 15 MINS: Performed by: SURGERY

## 2020-11-30 PROCEDURE — 63600175 PHARM REV CODE 636 W HCPCS: Performed by: NURSE ANESTHETIST, CERTIFIED REGISTERED

## 2020-11-30 RX ORDER — HYDROCODONE BITARTRATE AND ACETAMINOPHEN 5; 325 MG/1; MG/1
1 TABLET ORAL EVERY 6 HOURS PRN
Status: DISCONTINUED | OUTPATIENT
Start: 2020-11-30 | End: 2020-12-01 | Stop reason: HOSPADM

## 2020-11-30 RX ORDER — HYDROCODONE BITARTRATE AND ACETAMINOPHEN 5; 325 MG/1; MG/1
1 TABLET ORAL EVERY 8 HOURS PRN
Qty: 16 TABLET | Refills: 0 | Status: SHIPPED | OUTPATIENT
Start: 2020-11-30 | End: 2021-07-06

## 2020-11-30 RX ORDER — ROCURONIUM BROMIDE 10 MG/ML
INJECTION, SOLUTION INTRAVENOUS
Status: DISCONTINUED | OUTPATIENT
Start: 2020-11-30 | End: 2020-11-30

## 2020-11-30 RX ORDER — ACETAMINOPHEN 10 MG/ML
1000 INJECTION, SOLUTION INTRAVENOUS ONCE
Status: DISCONTINUED | OUTPATIENT
Start: 2020-11-30 | End: 2020-12-01 | Stop reason: HOSPADM

## 2020-11-30 RX ORDER — ACETAMINOPHEN 500 MG
1000 TABLET ORAL
Status: COMPLETED | OUTPATIENT
Start: 2020-11-30 | End: 2020-11-30

## 2020-11-30 RX ORDER — CELECOXIB 100 MG/1
CAPSULE ORAL
Status: DISCONTINUED
Start: 2020-11-30 | End: 2020-11-30 | Stop reason: HOSPADM

## 2020-11-30 RX ORDER — SODIUM CHLORIDE 0.9 % (FLUSH) 0.9 %
10 SYRINGE (ML) INJECTION
Status: DISCONTINUED | OUTPATIENT
Start: 2020-11-30 | End: 2020-12-01 | Stop reason: HOSPADM

## 2020-11-30 RX ORDER — DIPHENHYDRAMINE HYDROCHLORIDE 50 MG/ML
12.5 INJECTION INTRAMUSCULAR; INTRAVENOUS
Status: DISCONTINUED | OUTPATIENT
Start: 2020-11-30 | End: 2020-12-01 | Stop reason: HOSPADM

## 2020-11-30 RX ORDER — ONDANSETRON 2 MG/ML
4 INJECTION INTRAMUSCULAR; INTRAVENOUS DAILY PRN
Status: DISCONTINUED | OUTPATIENT
Start: 2020-11-30 | End: 2020-12-01 | Stop reason: HOSPADM

## 2020-11-30 RX ORDER — OXYCODONE HYDROCHLORIDE 5 MG/1
5 TABLET ORAL EVERY 4 HOURS PRN
Status: DISCONTINUED | OUTPATIENT
Start: 2020-11-30 | End: 2020-12-01 | Stop reason: HOSPADM

## 2020-11-30 RX ORDER — LIDOCAINE HYDROCHLORIDE 20 MG/ML
JELLY TOPICAL
Status: DISCONTINUED | OUTPATIENT
Start: 2020-11-30 | End: 2020-11-30

## 2020-11-30 RX ORDER — DIPHENHYDRAMINE HYDROCHLORIDE 50 MG/ML
12.5 INJECTION INTRAMUSCULAR; INTRAVENOUS EVERY 4 HOURS
Status: DISCONTINUED | OUTPATIENT
Start: 2020-11-30 | End: 2020-11-30 | Stop reason: HOSPADM

## 2020-11-30 RX ORDER — SUCCINYLCHOLINE CHLORIDE 20 MG/ML
INJECTION INTRAMUSCULAR; INTRAVENOUS
Status: DISCONTINUED | OUTPATIENT
Start: 2020-11-30 | End: 2020-11-30

## 2020-11-30 RX ORDER — DEXAMETHASONE SODIUM PHOSPHATE 4 MG/ML
INJECTION, SOLUTION INTRA-ARTICULAR; INTRALESIONAL; INTRAMUSCULAR; INTRAVENOUS; SOFT TISSUE
Status: DISCONTINUED | OUTPATIENT
Start: 2020-11-30 | End: 2020-11-30

## 2020-11-30 RX ORDER — CEFAZOLIN SODIUM 2 G/50ML
2 SOLUTION INTRAVENOUS ONCE
Status: COMPLETED | OUTPATIENT
Start: 2020-11-30 | End: 2020-11-30

## 2020-11-30 RX ORDER — ACETAMINOPHEN 500 MG
TABLET ORAL
Status: DISCONTINUED
Start: 2020-11-30 | End: 2020-11-30 | Stop reason: HOSPADM

## 2020-11-30 RX ORDER — ONDANSETRON 2 MG/ML
4 INJECTION INTRAMUSCULAR; INTRAVENOUS EVERY 12 HOURS PRN
Status: DISCONTINUED | OUTPATIENT
Start: 2020-11-30 | End: 2020-12-01 | Stop reason: HOSPADM

## 2020-11-30 RX ORDER — ONDANSETRON 4 MG/1
4 TABLET, ORALLY DISINTEGRATING ORAL ONCE
Status: DISCONTINUED | OUTPATIENT
Start: 2020-11-30 | End: 2020-12-01 | Stop reason: HOSPADM

## 2020-11-30 RX ORDER — FENTANYL CITRATE 50 UG/ML
INJECTION, SOLUTION INTRAMUSCULAR; INTRAVENOUS
Status: DISCONTINUED | OUTPATIENT
Start: 2020-11-30 | End: 2020-11-30

## 2020-11-30 RX ORDER — OXYCODONE HYDROCHLORIDE 5 MG/1
10 TABLET ORAL
Status: DISCONTINUED | OUTPATIENT
Start: 2020-11-30 | End: 2020-12-01 | Stop reason: HOSPADM

## 2020-11-30 RX ORDER — LIDOCAINE HYDROCHLORIDE 20 MG/ML
INJECTION, SOLUTION EPIDURAL; INFILTRATION; INTRACAUDAL; PERINEURAL
Status: DISCONTINUED | OUTPATIENT
Start: 2020-11-30 | End: 2020-11-30

## 2020-11-30 RX ORDER — MIDAZOLAM HYDROCHLORIDE 1 MG/ML
INJECTION INTRAMUSCULAR; INTRAVENOUS
Status: DISCONTINUED | OUTPATIENT
Start: 2020-11-30 | End: 2020-11-30

## 2020-11-30 RX ORDER — HYDROMORPHONE HYDROCHLORIDE 1 MG/ML
0.2 INJECTION, SOLUTION INTRAMUSCULAR; INTRAVENOUS; SUBCUTANEOUS
Status: DISCONTINUED | OUTPATIENT
Start: 2020-11-30 | End: 2020-12-01 | Stop reason: HOSPADM

## 2020-11-30 RX ORDER — PROPOFOL 10 MG/ML
VIAL (ML) INTRAVENOUS
Status: DISCONTINUED | OUTPATIENT
Start: 2020-11-30 | End: 2020-11-30

## 2020-11-30 RX ORDER — BUPIVACAINE HYDROCHLORIDE 5 MG/ML
INJECTION, SOLUTION EPIDURAL; INTRACAUDAL
Status: DISCONTINUED | OUTPATIENT
Start: 2020-11-30 | End: 2020-11-30 | Stop reason: HOSPADM

## 2020-11-30 RX ORDER — ACETAMINOPHEN 325 MG/1
650 TABLET ORAL EVERY 4 HOURS PRN
Status: DISCONTINUED | OUTPATIENT
Start: 2020-11-30 | End: 2020-12-01 | Stop reason: HOSPADM

## 2020-11-30 RX ORDER — DEXAMETHASONE SODIUM PHOSPHATE 4 MG/ML
4 INJECTION, SOLUTION INTRA-ARTICULAR; INTRALESIONAL; INTRAMUSCULAR; INTRAVENOUS; SOFT TISSUE ONCE
Status: DISCONTINUED | OUTPATIENT
Start: 2020-11-30 | End: 2020-12-01 | Stop reason: HOSPADM

## 2020-11-30 RX ORDER — FAMOTIDINE 10 MG/ML
INJECTION INTRAVENOUS
Status: DISCONTINUED | OUTPATIENT
Start: 2020-11-30 | End: 2020-11-30

## 2020-11-30 RX ORDER — CELECOXIB 100 MG/1
200 CAPSULE ORAL ONCE
Status: COMPLETED | OUTPATIENT
Start: 2020-11-30 | End: 2020-11-30

## 2020-11-30 RX ORDER — SODIUM CHLORIDE, SODIUM LACTATE, POTASSIUM CHLORIDE, CALCIUM CHLORIDE 600; 310; 30; 20 MG/100ML; MG/100ML; MG/100ML; MG/100ML
INJECTION, SOLUTION INTRAVENOUS CONTINUOUS PRN
Status: DISCONTINUED | OUTPATIENT
Start: 2020-11-30 | End: 2020-11-30

## 2020-11-30 RX ORDER — ONDANSETRON 2 MG/ML
INJECTION INTRAMUSCULAR; INTRAVENOUS
Status: DISCONTINUED | OUTPATIENT
Start: 2020-11-30 | End: 2020-11-30

## 2020-11-30 RX ADMIN — SUGAMMADEX 200 MG: 100 INJECTION, SOLUTION INTRAVENOUS at 11:11

## 2020-11-30 RX ADMIN — LIDOCAINE HYDROCHLORIDE 50 MG: 20 INJECTION, SOLUTION EPIDURAL; INFILTRATION; INTRACAUDAL; PERINEURAL at 10:11

## 2020-11-30 RX ADMIN — FENTANYL CITRATE 50 MCG: 50 INJECTION INTRAMUSCULAR; INTRAVENOUS at 10:11

## 2020-11-30 RX ADMIN — ROCURONIUM BROMIDE 45 MG: 10 INJECTION, SOLUTION INTRAVENOUS at 10:11

## 2020-11-30 RX ADMIN — ACETAMINOPHEN 1000 MG: 500 TABLET, FILM COATED ORAL at 09:11

## 2020-11-30 RX ADMIN — FAMOTIDINE 20 MG: 10 INJECTION, SOLUTION INTRAVENOUS at 10:11

## 2020-11-30 RX ADMIN — PROPOFOL 150 MG: 10 INJECTION, EMULSION INTRAVENOUS at 10:11

## 2020-11-30 RX ADMIN — DEXAMETHASONE SODIUM PHOSPHATE 8 MG: 4 INJECTION, SOLUTION INTRAMUSCULAR; INTRAVENOUS at 10:11

## 2020-11-30 RX ADMIN — LIDOCAINE HYDROCHLORIDE 2 ML: 20 JELLY TOPICAL at 10:11

## 2020-11-30 RX ADMIN — HYDROMORPHONE HYDROCHLORIDE 0.2 MG: 1 INJECTION, SOLUTION INTRAMUSCULAR; INTRAVENOUS; SUBCUTANEOUS at 12:11

## 2020-11-30 RX ADMIN — MIDAZOLAM HYDROCHLORIDE 2 MG: 1 INJECTION, SOLUTION INTRAMUSCULAR; INTRAVENOUS at 10:11

## 2020-11-30 RX ADMIN — SUCCINYLCHOLINE CHLORIDE 150 MG: 20 INJECTION, SOLUTION INTRAMUSCULAR; INTRAVENOUS at 10:11

## 2020-11-30 RX ADMIN — ONDANSETRON 4 MG: 2 INJECTION INTRAMUSCULAR; INTRAVENOUS at 12:11

## 2020-11-30 RX ADMIN — SODIUM CHLORIDE, SODIUM LACTATE, POTASSIUM CHLORIDE, AND CALCIUM CHLORIDE: .6; .31; .03; .02 INJECTION, SOLUTION INTRAVENOUS at 09:11

## 2020-11-30 RX ADMIN — CEFAZOLIN SODIUM 2 G: 2 SOLUTION INTRAVENOUS at 10:11

## 2020-11-30 RX ADMIN — CELECOXIB 200 MG: 100 CAPSULE ORAL at 09:11

## 2020-11-30 RX ADMIN — SODIUM CHLORIDE, SODIUM LACTATE, POTASSIUM CHLORIDE, AND CALCIUM CHLORIDE: .6; .31; .03; .02 INJECTION, SOLUTION INTRAVENOUS at 11:11

## 2020-11-30 RX ADMIN — ROCURONIUM BROMIDE 5 MG: 10 INJECTION, SOLUTION INTRAVENOUS at 10:11

## 2020-11-30 RX ADMIN — ONDANSETRON 4 MG: 2 INJECTION INTRAMUSCULAR; INTRAVENOUS at 10:11

## 2020-11-30 NOTE — ANESTHESIA PREPROCEDURE EVALUATION
11/30/2020  Rhiannon Rangel is a 55 y.o., female.    Patient Active Problem List   Diagnosis    Plantar fasciitis    Neuritis    Sjogren's syndrome       Past Surgical History:   Procedure Laterality Date    SHOULDER SURGERY Left         Tobacco Use:  The patient  reports that she has never smoked. She has never used smokeless tobacco.     Results for orders placed or performed during the hospital encounter of 11/25/20   EKG 12-lead    Collection Time: 11/25/20 11:02 AM    Narrative    Test Reason : Z01.811,    Vent. Rate : 072 BPM     Atrial Rate : 072 BPM     P-R Int : 144 ms          QRS Dur : 082 ms      QT Int : 396 ms       P-R-T Axes : 013 023 030 degrees     QTc Int : 433 ms    Normal sinus rhythm  Normal ECG  No previous ECGs available  Confirmed by Luiz Adames MD ([id number]) on 11/25/2020 8:01:54 PM    Referred By:  LOURDES           Confirmed By:Luiz Adames MD             Lab Results   Component Value Date    WBC 6.35 11/25/2020    HGB 12.5 11/25/2020    HCT 39.6 11/25/2020    MCV 90 11/25/2020     11/25/2020     BMP  Lab Results   Component Value Date     11/25/2020    K 4.1 11/25/2020     11/25/2020    CO2 28 11/25/2020    BUN 15 11/25/2020    CREATININE 0.6 11/25/2020    CALCIUM 8.7 11/25/2020    ANIONGAP 7 (L) 11/25/2020     11/25/2020    GLU 95 06/07/2019     10/02/2018       No results found for this or any previous visit.      Anesthesia Evaluation    I have reviewed the Patient Summary Reports.    I have reviewed the Nursing Notes. I have reviewed the NPO Status.   I have reviewed the Medications.     Review of Systems  Anesthesia Hx:  No problems with previous Anesthesia Denies Hx of Anesthetic complications  Denies Family Hx of Anesthesia complications.   Denies Personal Hx of Anesthesia complications.   Social:  Non-Smoker, No Alcohol Use     Hematology/Oncology:  Hematology Normal   Oncology Normal     EENT/Dental:   Sjogren's syndrome   Cardiovascular:   Hypertension, well controlled ECG has been reviewed.    Pulmonary:  Pulmonary Normal    Renal/:  Renal/ Normal     Hepatic/GI:   Hiatal Hernia, GERD, well controlled    Musculoskeletal:  Musculoskeletal Normal    Neurological:  Neurology Normal    Endocrine:  Endocrine Normal    Dermatological:  Skin Normal    Psych:  Psychiatric Normal           Physical Exam  General:  Obesity    Airway/Jaw/Neck:  Airway Findings: Mouth Opening: Normal Tongue: Normal  General Airway Assessment: Adult  Mallampati: III  Improves to II with phonation.  TM Distance: < 4 cm  Jaw/Neck Findings:  Neck ROM: Normal ROM      Dental:  Dental Findings: Molar caps   Chest/Lungs:  Chest/Lungs Findings: Clear to auscultation, Normal Respiratory Rate     Heart/Vascular:  Heart Findings: Rate: Normal  Rhythm: Regular Rhythm  Sounds: Normal        Mental Status:  Mental Status Findings:  Cooperative, Alert and Oriented         Anesthesia Plan  Type of Anesthesia, risks & benefits discussed:  Anesthesia Type:  general  Patient's Preference: General  Intra-op Monitoring Plan: standard ASA monitors  Intra-op Monitoring Plan Comments:   Post Op Pain Control Plan: multimodal analgesia, IV/PO Opioids PRN and per primary service following discharge from PACU  Post Op Pain Control Plan Comments:   Induction:    Beta Blocker:  Patient is not currently on a Beta-Blocker (No further documentation required).       Informed Consent: Patient understands risks and agrees with Anesthesia plan.  Questions answered.   ASA Score: 2     Day of Surgery Review of History & Physical:        Anesthesia Plan Notes: GETA  Decadron 8 mg iv   Zofran 4 mg iv  Pepcid 20 mg iv   Pre Operative Medicines: Tylenol 1000 mg po + Celebrex 200 mg po  Sugammadex         Ready For Surgery From Anesthesia Perspective.

## 2020-11-30 NOTE — ANESTHESIA POSTPROCEDURE EVALUATION
Anesthesia Post Evaluation    Patient: Rhiannon Rangel    Procedure(s) Performed: Procedure(s) (LRB):  CHOLECYSTECTOMY, LAPAROSCOPIC (N/A)    Final Anesthesia Type: general    Patient location during evaluation: PACU  Patient participation: Yes- Able to Participate  Level of consciousness: awake and alert  Post-procedure vital signs: reviewed and stable  Pain management: adequate  Airway patency: patent  CHANNING mitigation strategies: Multimodal analgesia, Extubation while patient is awake, Verification of full reversal of neuromuscular block and Extubation and recovery carried out in lateral, semiupright, or other nonsupine position  PONV status at discharge: No PONV  Anesthetic complications: no      Cardiovascular status: hemodynamically stable  Respiratory status: unassisted, spontaneous ventilation and room air  Hydration status: euvolemic  Follow-up not needed.          Vitals Value Taken Time   /67 11/30/20 1441   Temp 36.7 °C (98.1 °F) 11/30/20 1335   Pulse 78 11/30/20 1443   Resp 15 11/30/20 1443   SpO2 96 % 11/30/20 1443   Vitals shown include unvalidated device data.      Event Time   Out of Recovery 13:29:14         Pain/Rajni Score: Pain Rating Prior to Med Admin: 5 (11/30/2020 12:43 PM)  Rajni Score: 10 (11/30/2020  2:35 PM)

## 2020-11-30 NOTE — ANESTHESIA PROCEDURE NOTES
Intubation  Performed by: Jonathan Isbell CRNA  Authorized by: Jh Medrano MD     Intubation:     Induction:  Intravenous    Intubated:  Postinduction    Attempts:  1    Attempted By:  CRNA    Method of Intubation:  Direct    Blade:  Nova 2    Laryngeal View Grade: Grade I - full view of chords      Difficult Airway Encountered?: No      Complications:  None    Airway Device:  Oral endotracheal tube    Airway Device Size:  7.5    Style/Cuff Inflation:  Cuffed    Tube secured:  23    Secured at:  The lips    Placement Verified By:  Capnometry    Complicating Factors:  None    Findings Post-Intubation:  BS equal bilateral and atraumatic/condition of teeth unchanged  Notes:      Upper tooth guard used.

## 2020-11-30 NOTE — DISCHARGE INSTRUCTIONS
After Gallbladder Surgery  You can usually go home the same day as your surgery. In some cases, you may need to stay overnight. After open laparoscopic surgery, you will usually need to stay in the hospital for 2 to 5 days. Once youre at home, be sure to follow all your healthcare providers instructions.  In the hospital  Bandages will cover your incisions and you may have special boots on your legs to prevent blood clots. To aid recovery, youll be asked to get up and move as soon as possible. You may also be asked to use a device that helps promote deep breathing to keep your lungs clear.  At home  You can get back to your normal routine as soon as you feel able. To speed healing:  · Take any prescribed pain medicines as directed.  · Follow your healthcare providers instructions about bathing and caring for your incisions.  · Walk and move around as often as possible, but follow your healthcare provider's instructions on how much weight you can lift.   · Ask your healthcare provider about driving and going back to work. This is often about 5 to 10 days after surgery.  Eating normally again  Removing the gallbladder doesnt mean you have to be on a special diet. But you may want to start with light meals. It can also take a few weeks for your digestion to adjust. You may have indigestion, loose stools, or diarrhea. This is common and should go away in time.  Following up  Keep follow-up appointments during your recovery. These allow your healthcare provider to check your progress and answer any questions. Be sure to mention if you have any new symptoms. Also mention if you have diarrhea that doesnt go away.     Call your healthcare provider  Contact your healthcare provider if you have any of the following:  · Fever of 100.4º F (38.0ºC) or higher, or as directed by your healthcare provider  · Chills  · Increasing pain, redness, or drainage at an incision site  · Vomiting or nausea that lasts more than 12  hours  · Prolonged diarrhea  · Belly pain  · Leg swelling or trouble breathing  · Difficulty urinating  · Bleeding from the rectum   Date Last Reviewed: 7/1/2016 © 2000-2017 [employer]. [address] [place] [address], [place] [address]. All rights reserved. This information is not intended as a substitute for professional medical care. Always follow your healthcare professional's instructions.

## 2020-11-30 NOTE — TRANSFER OF CARE
"Anesthesia Transfer of Care Note    Patient: Rhiannon Rangel    Procedure(s) Performed: Procedure(s) (LRB):  CHOLECYSTECTOMY, LAPAROSCOPIC (N/A)    Anesthesia PACU Handoff    Last vitals:   Visit Vitals  BP (!) 147/95 (BP Location: Right arm, Patient Position: Lying)   Pulse 87   Temp 36.4 °C (97.6 °F) (Oral)   Resp 18   Ht 5' 5" (1.651 m)   Wt 108 kg (238 lb 1.6 oz)   SpO2 96%   Breastfeeding No   BMI 39.62 kg/m²     "

## 2020-11-30 NOTE — OP NOTE
Preop diagnosis chronic cholecystitis with cholelithiasis  Postop diagnosis same  Procedure laparoscopic cholecystectomy  Patient is placed supine on operating table where satisfactory general anesthesia.  Abdomen is widely prepped and draped sterile fashion.  I made a short transverse infraumbilical incision using Vidal technique into the abdomen.  Blunt-tipped 12 mm trocar was placed.  After insufflation of placed in upper midline 5 mm port and 2 right upper quadrant 5 mm ports.  General inspection showed no abnormalities of the gallbladder.  The cystic duct triangle was dissected out completely.  There were 2 bridging structures cystic artery and cystic duct.  They were both doubly clipped proximally clipped distally and transected.  The gallbladder free from the liver using the hook retractor connected electrocautery.  One to free from the liver pulled out through the umbilical port.  With evidence of satisfactory hemostasis the ports were removed abdomen was decompressed fascia was closed interrupted 0 Vicryl.  All incisions were infiltrated with Marcaine and Exparel.  Skin incisions were closed with absorbable suture.  Patient tolerated procedure well.

## 2020-11-30 NOTE — TRANSFER OF CARE
"Anesthesia Transfer of Care Note    Patient: Rhiannon Rangel    Procedure(s) Performed: Procedure(s) (LRB):  CHOLECYSTECTOMY, LAPAROSCOPIC (N/A)    Patient location: PACU    Anesthesia Type: general    Transport from OR: Transported from OR on room air with adequate spontaneous ventilation    Post pain: adequate analgesia    Post assessment: no apparent anesthetic complications    Post vital signs: stable    Level of consciousness: awake and alert    Nausea/Vomiting: no nausea/vomiting    Complications: none    Transfer of care protocol was followed      Last vitals:   Visit Vitals  BP (!) 147/95 (BP Location: Right arm, Patient Position: Lying)   Pulse 87   Temp 36.4 °C (97.6 °F) (Oral)   Resp 18   Ht 5' 5" (1.651 m)   Wt 108 kg (238 lb 1.6 oz)   SpO2 96%   Breastfeeding No   BMI 39.62 kg/m²     "

## 2021-05-27 ENCOUNTER — OFFICE VISIT (OUTPATIENT)
Dept: RHEUMATOLOGY | Facility: CLINIC | Age: 56
End: 2021-05-27
Payer: COMMERCIAL

## 2021-05-27 VITALS
SYSTOLIC BLOOD PRESSURE: 137 MMHG | WEIGHT: 241.19 LBS | DIASTOLIC BLOOD PRESSURE: 82 MMHG | BODY MASS INDEX: 40.14 KG/M2

## 2021-05-27 DIAGNOSIS — M35.01 SJOGREN'S SYNDROME WITH KERATOCONJUNCTIVITIS SICCA: Primary | ICD-10-CM

## 2021-05-27 PROCEDURE — 3008F BODY MASS INDEX DOCD: CPT | Mod: S$GLB,,, | Performed by: INTERNAL MEDICINE

## 2021-05-27 PROCEDURE — 1125F PR PAIN SEVERITY QUANTIFIED, PAIN PRESENT: ICD-10-PCS | Mod: S$GLB,,, | Performed by: INTERNAL MEDICINE

## 2021-05-27 PROCEDURE — 1125F AMNT PAIN NOTED PAIN PRSNT: CPT | Mod: S$GLB,,, | Performed by: INTERNAL MEDICINE

## 2021-05-27 PROCEDURE — 99214 OFFICE O/P EST MOD 30 MIN: CPT | Mod: S$GLB,,, | Performed by: INTERNAL MEDICINE

## 2021-05-27 PROCEDURE — 3008F PR BODY MASS INDEX (BMI) DOCUMENTED: ICD-10-PCS | Mod: S$GLB,,, | Performed by: INTERNAL MEDICINE

## 2021-05-27 PROCEDURE — 99214 PR OFFICE/OUTPT VISIT, EST, LEVL IV, 30-39 MIN: ICD-10-PCS | Mod: S$GLB,,, | Performed by: INTERNAL MEDICINE

## 2021-05-27 RX ORDER — NEBIVOLOL HYDROCHLORIDE 5 MG/1
5 TABLET ORAL DAILY
COMMUNITY
Start: 2021-04-21 | End: 2022-08-29

## 2021-06-08 LAB
ACE SERPL-CCNC: 74 U/L (ref 9–67)
ALBUMIN SERPL-MCNC: 4 G/DL (ref 3.6–5.1)
ALBUMIN/GLOB SERPL: 1.8 (CALC) (ref 1–2.5)
ALP SERPL-CCNC: 97 U/L (ref 37–153)
ALT SERPL-CCNC: 37 U/L (ref 6–29)
ANA PAT SER IF-IMP: ABNORMAL
ANA SER QL IF: POSITIVE
ANA TITR SER IF: ABNORMAL TITER
AST SERPL-CCNC: 23 U/L (ref 10–35)
BASOPHILS # BLD AUTO: 28 CELLS/UL (ref 0–200)
BASOPHILS NFR BLD AUTO: 0.4 %
BILIRUB SERPL-MCNC: 0.4 MG/DL (ref 0.2–1.2)
BUN SERPL-MCNC: 15 MG/DL (ref 7–25)
BUN/CREAT SERPL: ABNORMAL (CALC) (ref 6–22)
CALCIUM SERPL-MCNC: 8.7 MG/DL (ref 8.6–10.4)
CHLORIDE SERPL-SCNC: 103 MMOL/L (ref 98–110)
CO2 SERPL-SCNC: 28 MMOL/L (ref 20–32)
CREAT SERPL-MCNC: 0.63 MG/DL (ref 0.5–1.05)
CRP SERPL-MCNC: 15.6 MG/L
ENA SS-A AB SER IA-ACNC: NORMAL AI
EOSINOPHIL # BLD AUTO: 90 CELLS/UL (ref 15–500)
EOSINOPHIL NFR BLD AUTO: 1.3 %
ERYTHROCYTE [DISTWIDTH] IN BLOOD BY AUTOMATED COUNT: 14.9 % (ref 11–15)
GLOBULIN SER CALC-MCNC: 2.2 G/DL (CALC) (ref 1.9–3.7)
GLUCOSE SERPL-MCNC: 125 MG/DL (ref 65–99)
HBV SURFACE AG SERPL QL IA: NORMAL
HCT VFR BLD AUTO: 40 % (ref 35–45)
HCV AB S/CO SERPL IA: 0.01
HCV AB SERPL QL IA: NORMAL
HGB BLD-MCNC: 12.9 G/DL (ref 11.7–15.5)
HIV 1+2 AB+HIV1 P24 AG SERPL QL IA: NORMAL
LYMPHOCYTES # BLD AUTO: 2118 CELLS/UL (ref 850–3900)
LYMPHOCYTES NFR BLD AUTO: 30.7 %
MCH RBC QN AUTO: 27.7 PG (ref 27–33)
MCHC RBC AUTO-ENTMCNC: 32.3 G/DL (ref 32–36)
MCV RBC AUTO: 86 FL (ref 80–100)
MONOCYTES # BLD AUTO: 359 CELLS/UL (ref 200–950)
MONOCYTES NFR BLD AUTO: 5.2 %
NEUTROPHILS # BLD AUTO: 4306 CELLS/UL (ref 1500–7800)
NEUTROPHILS NFR BLD AUTO: 62.4 %
PLATELET # BLD AUTO: 305 THOUSAND/UL (ref 140–400)
PMV BLD REES-ECKER: 9.2 FL (ref 7.5–12.5)
POTASSIUM SERPL-SCNC: 4.1 MMOL/L (ref 3.5–5.3)
PROT SERPL-MCNC: 6.2 G/DL (ref 6.1–8.1)
RBC # BLD AUTO: 4.65 MILLION/UL (ref 3.8–5.1)
SODIUM SERPL-SCNC: 138 MMOL/L (ref 135–146)
WBC # BLD AUTO: 6.9 THOUSAND/UL (ref 3.8–10.8)

## 2021-06-22 DIAGNOSIS — R53.83 LOSS OF ENERGY: ICD-10-CM

## 2021-06-22 DIAGNOSIS — G47.19 EXCESSIVE DAYTIME SLEEPINESS: ICD-10-CM

## 2021-06-22 DIAGNOSIS — G47.33 OSA (OBSTRUCTIVE SLEEP APNEA): Primary | ICD-10-CM

## 2021-06-22 DIAGNOSIS — I10 HYPERTENSION: ICD-10-CM

## 2021-06-22 DIAGNOSIS — R06.83 SNORING: ICD-10-CM

## 2021-07-06 ENCOUNTER — OFFICE VISIT (OUTPATIENT)
Dept: RHEUMATOLOGY | Facility: CLINIC | Age: 56
End: 2021-07-06
Payer: COMMERCIAL

## 2021-07-06 VITALS
WEIGHT: 244.38 LBS | SYSTOLIC BLOOD PRESSURE: 124 MMHG | BODY MASS INDEX: 40.67 KG/M2 | DIASTOLIC BLOOD PRESSURE: 81 MMHG

## 2021-07-06 DIAGNOSIS — M35.01 SJOGREN'S SYNDROME WITH KERATOCONJUNCTIVITIS SICCA: Primary | ICD-10-CM

## 2021-07-06 PROCEDURE — 1125F PR PAIN SEVERITY QUANTIFIED, PAIN PRESENT: ICD-10-PCS | Mod: S$GLB,,, | Performed by: INTERNAL MEDICINE

## 2021-07-06 PROCEDURE — 1125F AMNT PAIN NOTED PAIN PRSNT: CPT | Mod: S$GLB,,, | Performed by: INTERNAL MEDICINE

## 2021-07-06 PROCEDURE — 3008F BODY MASS INDEX DOCD: CPT | Mod: S$GLB,,, | Performed by: INTERNAL MEDICINE

## 2021-07-06 PROCEDURE — 3008F PR BODY MASS INDEX (BMI) DOCUMENTED: ICD-10-PCS | Mod: S$GLB,,, | Performed by: INTERNAL MEDICINE

## 2021-07-06 PROCEDURE — 99213 OFFICE O/P EST LOW 20 MIN: CPT | Mod: S$GLB,,, | Performed by: INTERNAL MEDICINE

## 2021-07-06 PROCEDURE — 99213 PR OFFICE/OUTPT VISIT, EST, LEVL III, 20-29 MIN: ICD-10-PCS | Mod: S$GLB,,, | Performed by: INTERNAL MEDICINE

## 2021-07-06 RX ORDER — AMLODIPINE BESYLATE 2.5 MG/1
2.5 TABLET ORAL DAILY
COMMUNITY
Start: 2021-06-04

## 2021-07-09 ENCOUNTER — HOSPITAL ENCOUNTER (OUTPATIENT)
Dept: RADIOLOGY | Facility: HOSPITAL | Age: 56
Discharge: HOME | End: 2021-07-09
Attending: INTERNAL MEDICINE
Payer: COMMERCIAL

## 2021-07-09 ENCOUNTER — HOSPITAL ENCOUNTER (OUTPATIENT)
Dept: PULMONOLOGY | Facility: HOSPITAL | Age: 56
Discharge: HOME | End: 2021-07-09
Attending: INTERNAL MEDICINE
Payer: COMMERCIAL

## 2021-07-09 ENCOUNTER — PROCEDURE VISIT (OUTPATIENT)
Dept: SLEEP MEDICINE | Facility: HOSPITAL | Age: 56
End: 2021-07-09
Attending: INTERNAL MEDICINE
Payer: COMMERCIAL

## 2021-07-09 DIAGNOSIS — I10 HYPERTENSION: ICD-10-CM

## 2021-07-09 DIAGNOSIS — R06.83 SNORING: ICD-10-CM

## 2021-07-09 DIAGNOSIS — M35.01 SJOGREN'S SYNDROME WITH KERATOCONJUNCTIVITIS SICCA: ICD-10-CM

## 2021-07-09 DIAGNOSIS — G47.33 OSA (OBSTRUCTIVE SLEEP APNEA): ICD-10-CM

## 2021-07-09 DIAGNOSIS — G47.19 EXCESSIVE DAYTIME SLEEPINESS: ICD-10-CM

## 2021-07-09 DIAGNOSIS — R53.83 LOSS OF ENERGY: ICD-10-CM

## 2021-07-09 PROCEDURE — 94010 BREATHING CAPACITY TEST: CPT

## 2021-07-09 PROCEDURE — 95810 POLYSOM 6/> YRS 4/> PARAM: CPT

## 2021-07-09 PROCEDURE — 71046 X-RAY EXAM CHEST 2 VIEWS: CPT | Mod: TC

## 2021-07-20 DIAGNOSIS — G47.33 OSA (OBSTRUCTIVE SLEEP APNEA): Primary | ICD-10-CM

## 2021-07-20 DIAGNOSIS — R53.83 LOSS OF ENERGY: ICD-10-CM

## 2021-07-20 DIAGNOSIS — R06.83 SNORING: ICD-10-CM

## 2021-08-13 DIAGNOSIS — Z01.818 OTHER SPECIFIED PRE-OPERATIVE EXAMINATION: Primary | ICD-10-CM

## 2021-09-01 ENCOUNTER — HOSPITAL ENCOUNTER (OUTPATIENT)
Dept: PREADMISSION TESTING | Facility: HOSPITAL | Age: 56
Discharge: HOME | End: 2021-09-01
Attending: INTERNAL MEDICINE
Payer: COMMERCIAL

## 2021-09-01 DIAGNOSIS — Z01.818 OTHER SPECIFIED PRE-OPERATIVE EXAMINATION: ICD-10-CM

## 2021-09-22 ENCOUNTER — HOSPITAL ENCOUNTER (OUTPATIENT)
Dept: PREADMISSION TESTING | Facility: HOSPITAL | Age: 56
Discharge: HOME | End: 2021-09-22
Attending: SURGERY
Payer: COMMERCIAL

## 2021-09-22 DIAGNOSIS — G47.30 SLEEP APNEA IN ADULT: Primary | ICD-10-CM

## 2021-09-22 LAB — SARS-COV-2 RDRP RESP QL NAA+PROBE: NEGATIVE

## 2021-09-22 PROCEDURE — U0002 COVID-19 LAB TEST NON-CDC: HCPCS | Performed by: INTERNAL MEDICINE

## 2021-09-24 ENCOUNTER — PROCEDURE VISIT (OUTPATIENT)
Dept: SLEEP MEDICINE | Facility: HOSPITAL | Age: 56
End: 2021-09-24
Attending: INTERNAL MEDICINE
Payer: COMMERCIAL

## 2021-09-24 DIAGNOSIS — R06.83 SNORING: ICD-10-CM

## 2021-09-24 DIAGNOSIS — G47.33 OSA (OBSTRUCTIVE SLEEP APNEA): ICD-10-CM

## 2021-09-24 DIAGNOSIS — R53.83 LOSS OF ENERGY: ICD-10-CM

## 2021-09-24 PROCEDURE — 95811 POLYSOM 6/>YRS CPAP 4/> PARM: CPT

## 2022-02-21 ENCOUNTER — TELEPHONE (OUTPATIENT)
Dept: BARIATRICS | Facility: CLINIC | Age: 57
End: 2022-02-21
Payer: COMMERCIAL

## 2022-02-23 DIAGNOSIS — D84.9 IMMUNOSUPPRESSED STATUS: ICD-10-CM

## 2022-03-14 DIAGNOSIS — R06.02 SHORTNESS OF BREATH: Primary | ICD-10-CM

## 2022-03-23 ENCOUNTER — OFFICE VISIT (OUTPATIENT)
Dept: BARIATRICS | Facility: CLINIC | Age: 57
End: 2022-03-23
Payer: COMMERCIAL

## 2022-03-23 VITALS
BODY MASS INDEX: 40.72 KG/M2 | RESPIRATION RATE: 16 BRPM | WEIGHT: 244.38 LBS | SYSTOLIC BLOOD PRESSURE: 143 MMHG | TEMPERATURE: 98 F | HEART RATE: 75 BPM | HEIGHT: 65 IN | DIASTOLIC BLOOD PRESSURE: 76 MMHG

## 2022-03-23 DIAGNOSIS — I10 HYPERTENSION, UNSPECIFIED TYPE: ICD-10-CM

## 2022-03-23 DIAGNOSIS — E66.01 MORBID OBESITY: Primary | ICD-10-CM

## 2022-03-23 DIAGNOSIS — G47.33 OSA (OBSTRUCTIVE SLEEP APNEA): ICD-10-CM

## 2022-03-23 DIAGNOSIS — E66.01 MORBID OBESITY WITH BMI OF 40.0-44.9, ADULT: ICD-10-CM

## 2022-03-23 PROCEDURE — 99205 PR OFFICE/OUTPT VISIT, NEW, LEVL V, 60-74 MIN: ICD-10-PCS | Mod: S$GLB,,, | Performed by: SURGERY

## 2022-03-23 PROCEDURE — 3078F DIAST BP <80 MM HG: CPT | Mod: CPTII,S$GLB,, | Performed by: SURGERY

## 2022-03-23 PROCEDURE — 99999 PR PBB SHADOW E&M-EST. PATIENT-LVL V: CPT | Mod: PBBFAC,,, | Performed by: SURGERY

## 2022-03-23 PROCEDURE — 4010F ACE/ARB THERAPY RXD/TAKEN: CPT | Mod: CPTII,S$GLB,, | Performed by: SURGERY

## 2022-03-23 PROCEDURE — 1160F PR REVIEW ALL MEDS BY PRESCRIBER/CLIN PHARMACIST DOCUMENTED: ICD-10-PCS | Mod: CPTII,S$GLB,, | Performed by: SURGERY

## 2022-03-23 PROCEDURE — 3078F PR MOST RECENT DIASTOLIC BLOOD PRESSURE < 80 MM HG: ICD-10-PCS | Mod: CPTII,S$GLB,, | Performed by: SURGERY

## 2022-03-23 PROCEDURE — 4010F PR ACE/ARB THEARPY RXD/TAKEN: ICD-10-PCS | Mod: CPTII,S$GLB,, | Performed by: SURGERY

## 2022-03-23 PROCEDURE — 3008F BODY MASS INDEX DOCD: CPT | Mod: CPTII,S$GLB,, | Performed by: SURGERY

## 2022-03-23 PROCEDURE — 99205 OFFICE O/P NEW HI 60 MIN: CPT | Mod: S$GLB,,, | Performed by: SURGERY

## 2022-03-23 PROCEDURE — 3077F PR MOST RECENT SYSTOLIC BLOOD PRESSURE >= 140 MM HG: ICD-10-PCS | Mod: CPTII,S$GLB,, | Performed by: SURGERY

## 2022-03-23 PROCEDURE — 1160F RVW MEDS BY RX/DR IN RCRD: CPT | Mod: CPTII,S$GLB,, | Performed by: SURGERY

## 2022-03-23 PROCEDURE — 1159F PR MEDICATION LIST DOCUMENTED IN MEDICAL RECORD: ICD-10-PCS | Mod: CPTII,S$GLB,, | Performed by: SURGERY

## 2022-03-23 PROCEDURE — 3077F SYST BP >= 140 MM HG: CPT | Mod: CPTII,S$GLB,, | Performed by: SURGERY

## 2022-03-23 PROCEDURE — 99999 PR PBB SHADOW E&M-EST. PATIENT-LVL V: ICD-10-PCS | Mod: PBBFAC,,, | Performed by: SURGERY

## 2022-03-23 PROCEDURE — 1159F MED LIST DOCD IN RCRD: CPT | Mod: CPTII,S$GLB,, | Performed by: SURGERY

## 2022-03-23 PROCEDURE — 3008F PR BODY MASS INDEX (BMI) DOCUMENTED: ICD-10-PCS | Mod: CPTII,S$GLB,, | Performed by: SURGERY

## 2022-03-23 NOTE — PROGRESS NOTES
Initial Consult    Chief Complaint   Patient presents with    Obesity       History of Present Illness:  Patient is a 56 y.o. female who is referred for evaluation of surgical treatment of morbid obesity. Her Body mass index is 40.67 kg/m². She has known comorbidities of hypertension and obstructive sleep apnea. She has not attended the bariatric seminar and is most interested in gastric sleeve surgery.      Past attempts at weight loss include: Unsupervised: calorie counting, gym membership, low carb, prescript fit;  Supervised:  Diet pills from MD, weight watchers;  Diet pills: none;  Exercise attempts: walking or running, treadmill    Weight history:   At current weight:  3 years  Obese for 15 years.  More than 35 pounds overweight for 20 years.  More than 100 pounds overweight for 15 years.  Started dieting at 29 years old.  Maximum weight reached: 252 pounds  Most weight lost was 35 pounds through weight watchers for 6 months.  She describes Her eating habits as sweet eater, emotional eater.    CHANNING screening: tested positive, cant wear mask and machine    Reflux screening: takes medications for this, controlled    Review of patient's allergies indicates:   Allergen Reactions    Demerol [meperidine] Nausea And Vomiting       Current Outpatient Medications   Medication Sig Dispense Refill    alendronate (FOSAMAX) 70 MG tablet Take 70 mg by mouth every 7 days.       amLODIPine (NORVASC) 2.5 MG tablet Take 2.5 mg by mouth once daily.      BYSTOLIC 5 mg Tab Take 5 mg by mouth once daily.      CALCIUM CARB-MAG OXIDE-ZINC OX ORAL Take by mouth.      cevimeline (EVOXAC) 30 mg capsule Take 30 mg by mouth 4 (four) times daily.   1    escitalopram oxalate (LEXAPRO) 20 MG tablet Take 10 mg by mouth once daily.  4    irbesartan (AVAPRO) 300 MG tablet Take 300 mg by mouth once daily.       omeprazole (PRILOSEC) 20 MG capsule Take 20 mg by mouth once daily.   2    RESTASIS 0.05 % ophthalmic emulsion INT 1 GTT IN OU  "BID  3    vitamin D (VITAMIN D3) 1000 units Tab Vitamin D3 1,000 unit tablet   Take 1 tablet every day by oral route.       No current facility-administered medications for this visit.       Past Medical History:   Diagnosis Date    Hernia, hiatal     Hypertension     Sjogren's disease      Past Surgical History:   Procedure Laterality Date    KNEE ARTHROSCOPY W/ MENISCAL REPAIR Left 2020    LAPAROSCOPIC CHOLECYSTECTOMY N/A 11/30/2020    Procedure: CHOLECYSTECTOMY, LAPAROSCOPIC;  Surgeon: Shagufta Osuna MD;  Location: [facility];  Service: General;  Laterality: N/A;    SHOULDER SURGERY Left      Family History   Problem Relation Age of Onset    Hypertension Mother     Hyperlipidemia Mother     Cancer Father         Lung CA    Hypertension Father     Hyperlipidemia Father     Diabetes Maternal Aunt     Obesity Paternal Aunt      Social History     Tobacco Use    Smoking status: Never Smoker    Smokeless tobacco: Never Used   Substance Use Topics    Alcohol use: No    Drug use: No          Review of Systems:  Review of Systems   Constitutional: Positive for appetite change and fatigue.   HENT: Positive for congestion.    Respiratory: Positive for apnea and cough.    Gastrointestinal: Positive for nausea.   Genitourinary: Positive for urgency.   Musculoskeletal: Positive for joint swelling.   Neurological: Positive for headaches.   All other systems reviewed and are negative.      Physical:     Vital Signs (Most Recent)  Temp: 97.8 °F (36.6 °C) (03/23/22 1003)  Pulse: 75 (03/23/22 1003)  Resp: 16 (03/23/22 1003)  BP: (!) 143/76 (03/23/22 1003)  5' 5" (1.651 m)  110.9 kg (244 lb 6.4 oz)     Body comp:  Fat Percent:  52.7 %  Fat Mass:  128.8 lb  FFM:  115.6 lb  TBW: 84.4 lb  TBW %:  34.5 %  BMR: 1673 kcal    Physical Exam  Vitals and nursing note reviewed.   Constitutional:       General: She is not in acute distress.     Appearance: She is well-developed. She is not diaphoretic.   HENT:      Head: " Normocephalic and atraumatic.      Mouth/Throat:      Pharynx: No oropharyngeal exudate.   Eyes:      General: No scleral icterus.     Conjunctiva/sclera: Conjunctivae normal.      Pupils: Pupils are equal, round, and reactive to light.   Neck:      Thyroid: No thyromegaly.      Vascular: No JVD.      Trachea: No tracheal deviation.   Cardiovascular:      Rate and Rhythm: Normal rate and regular rhythm.      Heart sounds: Normal heart sounds. No murmur heard.    No friction rub. No gallop.   Pulmonary:      Effort: Pulmonary effort is normal. No respiratory distress.      Breath sounds: Normal breath sounds. No stridor. No wheezing or rales.   Chest:      Chest wall: No tenderness.   Abdominal:      General: Bowel sounds are normal. There is no distension.      Palpations: Abdomen is soft. There is no mass.      Tenderness: There is no abdominal tenderness. There is no guarding or rebound.   Musculoskeletal:         General: No tenderness. Normal range of motion.      Cervical back: Normal range of motion and neck supple.   Lymphadenopathy:      Cervical: No cervical adenopathy.   Skin:     General: Skin is warm and dry.      Findings: No erythema or rash.   Neurological:      Mental Status: She is alert and oriented to person, place, and time.      Cranial Nerves: No cranial nerve deficit.   Psychiatric:         Behavior: Behavior normal.         ASSESSMENT/PLAN:        1. Morbid obesity  Ambulatory referral/consult to Nutrition Services    FL Upper GI    EKG 12-lead    Ambulatory referral/consult to Psychiatry    Ambulatory referral/consult to Cardiology   2. Morbid obesity with BMI of 40.0-44.9, adult     3. Hypertension, unspecified type     4. CHANNING (obstructive sleep apnea)         Plan:  Rhiannon Rangel has morbid obesity as their Body mass index is 40.67 kg/m². She would benefit from weight loss surgery and has chosen gastric sleeve surgery as the preferred procedure. She understands that this is a tool and  lifestyle change will be necessary to keep weight off. I went over possible complications of all surgeries with the patient and she is agreeable to surgery.    She will need:    Labs  EKG  UGI ---  dietary consult  psych consult   Seminar  EGD- endoscopy done Dr. Short- reportedly no problems - will request    I will obtain the following clearances prior to surgery: cardiology     She has multiple worsening medical problems which include obesity, hypertension, sleep apnea.  We will plan to treat this with diet, exercise, and possibly elective major surgery.  She has risk factors for elective major surgery which include sleep apnea, hypertension.    Diet plan: high protein low carb- mainly meats and vegetables  Exercise plan: Cardiovascular exercise, get HR over 100 for 20 minutes 3 times per week.  Start multivitamin

## 2022-03-30 ENCOUNTER — CLINICAL SUPPORT (OUTPATIENT)
Dept: BARIATRICS | Facility: CLINIC | Age: 57
End: 2022-03-30
Payer: COMMERCIAL

## 2022-03-30 VITALS — BODY MASS INDEX: 40.67 KG/M2 | HEIGHT: 65 IN

## 2022-03-30 DIAGNOSIS — Z71.3 DIETARY COUNSELING AND SURVEILLANCE: ICD-10-CM

## 2022-03-30 DIAGNOSIS — Z71.3 PRE-BARIATRIC SURGERY NUTRITION EVALUATION: ICD-10-CM

## 2022-03-30 DIAGNOSIS — E66.01 MORBID OBESITY: ICD-10-CM

## 2022-03-30 PROCEDURE — 97802 MEDICAL NUTRITION INDIV IN: CPT | Mod: S$GLB,,, | Performed by: DIETITIAN, REGISTERED

## 2022-03-30 PROCEDURE — 99999 PR PBB SHADOW E&M-EST. PATIENT-LVL III: ICD-10-PCS | Mod: PBBFAC,,, | Performed by: DIETITIAN, REGISTERED

## 2022-03-30 PROCEDURE — 97802 PR MED NUTR THER, 1ST, INDIV, EA 15 MIN: ICD-10-PCS | Mod: S$GLB,,, | Performed by: DIETITIAN, REGISTERED

## 2022-03-30 PROCEDURE — 99999 PR PBB SHADOW E&M-EST. PATIENT-LVL III: CPT | Mod: PBBFAC,,, | Performed by: DIETITIAN, REGISTERED

## 2022-03-30 NOTE — PROGRESS NOTES
NUTRITIONAL CONSULT    Referring Physician: Dr. Oseguera  Reason for MNT Referral: Initial assessment for sleeve gastrectomy work-up    PAST MEDICAL HISTORY:   56 y.o. female presents with a BMI of Body mass index is 40.87 kg/m² (pended)..  Past attempts at weight loss include: Unsupervised: calorie counting, gym membership, low carb, prescript fit;  Supervised:  Diet pills from MD, weight watchers;  Diet pills: none;  Exercise attempts: walking or running, treadmill     Weight history:   At current weight:  3 years  Obese for 15 years.  More than 35 pounds overweight for 20 years.  More than 100 pounds overweight for 15 years.  Started dieting at 29 years old.  Maximum weight reached: 252 pounds  Most weight lost was 35 pounds through weight watchers for 6 months.  She describes Her eating habits as sweet eater, emotional eater.    Past Medical History:   Diagnosis Date    Hernia, hiatal     Hypertension     Sjogren's disease        CLINICAL DATA:  56 y.o.-year-old White female.  Height: 5'5  Weight: 245 lbs  IBW: 141 lbs  BMI: 40.87  The patient's goal weight (50 % EBW): 193 lbs    Goal for Bariatric Surgery: to lose weight    NUTRITION & HEALTH HISTORY:  Greatest challenge: portion control    Current diet recall: Breakfast: skips, PrescriptFit shake  Lunch: egg salad  Dinner: protein + vegetable + side dish.  Eats dinner before 5 pm  Snack: fruit, raisin bran, Cheerios  Current Diet:  Meal pattern: irregular  Protein supplements: Prescriptfit, but she states she is having a hard time tolerating it.  Will change.  Snacking: bowl of cereal as snack in evening twice a week  Vegetables: Likes a variety. Eats daily.  Fruits: Likes a variety. Eats almost daily.  Beverages: water and sugar-free beverages. Coke Zero.  Dining out: Weekly. Mostly restaurants.  Cooking at home: Daily. Mostly baked and grilled meat, fish, starchy CHO and vegetables.    Exercise:  Past exercise: None    Current exercise: None. She reports  she lives an active life keeping up with her grandchildren and fram.  Restrictions to exercise: none    Vitamins / Minerals / Herbs:   Calcium/zinc/magnesium, vitamin C, 1000 IU vitamin D, B12, B complex, fish oil    Food Allergies:   NKFA    Social:  Works as   Lives with .    Grocery shopping and food prep herself, but also feeds grandchildren which influences what she cooks.  Patient believes the household will be supportive after surgery.  Alcohol: None.  Smoking: None.    ASSESSMENT:  · Patient reports attempts at weight loss, only to regain lost weight.  · Patient demonstrated knowledge of healthy eating behaviors and exercise patterns; admits to not eating healthy and not exercising at this point.  · Patient states willingness to change lifestyle and make behavior modifications.  · Expect fair  compliance after surgery at this time.    Insurance requires medically supervised diet prior to consideration for bariatric surgery.    BARIATRIC DIET DISCUSSION:  Discussed diet after surgery and related to patients food record.  Reviewed diet progression before and after surgery.  Stressed importance of exercise and its role in achieving weight loss goals.  Answered all questions.  Reinforces surgery is not a magic bullet. It requires lifestyle changes.    RECOMMENDATIONS:  Patient is a potential candidate for bariatric surgery.    Needs additional visit(s) with RD.    PLAN:  Resume work-up for surgery.  Continue to review Bariatric Nutrition Guidebook at home and call with any questions.  Work on Bariatric Nutrition Checklist.  Work on gradually cutting back on starchy CHO in the diet.  Start including protein supplements in the diet plan daily.  More grocery shopping and meal preparation at home.  Increase exercise.   Goal of 60 g protein daily.  Have bran flakes, oatmeal, greek yogurt, cottage cheese, or Cheerios in morning. Add your own fruit.  Drink protein shake as snack to reach 60 g  protein goal.     SESSION TIME:  60 minutes

## 2022-04-20 ENCOUNTER — CLINICAL SUPPORT (OUTPATIENT)
Dept: BARIATRICS | Facility: CLINIC | Age: 57
End: 2022-04-20
Payer: COMMERCIAL

## 2022-04-20 VITALS — BODY MASS INDEX: 40.67 KG/M2 | HEIGHT: 65 IN

## 2022-04-20 DIAGNOSIS — Z71.3 PRE-BARIATRIC SURGERY NUTRITION EVALUATION: ICD-10-CM

## 2022-04-20 DIAGNOSIS — E66.01 CLASS 3 SEVERE OBESITY DUE TO EXCESS CALORIES WITH BODY MASS INDEX (BMI) OF 40.0 TO 44.9 IN ADULT, UNSPECIFIED WHETHER SERIOUS COMORBIDITY PRESENT: ICD-10-CM

## 2022-04-20 DIAGNOSIS — Z71.3 DIETARY COUNSELING AND SURVEILLANCE: ICD-10-CM

## 2022-04-20 PROCEDURE — 99999 PR PBB SHADOW E&M-EST. PATIENT-LVL III: ICD-10-PCS | Mod: PBBFAC,,, | Performed by: DIETITIAN, REGISTERED

## 2022-04-20 PROCEDURE — 97803 PR MED NUTR THER, SUBSQ, INDIV, EA 15 MIN: ICD-10-PCS | Mod: S$GLB,,, | Performed by: DIETITIAN, REGISTERED

## 2022-04-20 PROCEDURE — 99999 PR PBB SHADOW E&M-EST. PATIENT-LVL III: CPT | Mod: PBBFAC,,, | Performed by: DIETITIAN, REGISTERED

## 2022-04-20 PROCEDURE — 97803 MED NUTRITION INDIV SUBSEQ: CPT | Mod: S$GLB,,, | Performed by: DIETITIAN, REGISTERED

## 2022-04-21 NOTE — PROGRESS NOTES
NUTRITION NOTE    Referring Physician: Dr. Oseguera  Reason for MNT Referral: Medically Supervised Diet pending Gastric Sleeve    PAST MEDICAL HISTORY:    Patient presents for 2nd visit for MSD with weight loss over the past month; total weight loss by making numerous dietary and lifestyle changes.    Past Medical History:   Diagnosis Date    Hernia, hiatal     Hypertension     Sjogren's disease        CLINICAL DATA:  56 y.o. female.  Height:5'5  Current Weight: 243 lbs  Weight Change Since Initial Visit:-1 lbs  Ideal Body Weight: 141 lbs  BMI: 40.54    CURRENT DIET:  Diet Recall: Breakfast: eggs, Russian dooley, has it in a wrap 2x a week, oatmeal  Lunch: chicken sausage with salad  Snack: turkey jerkey, steak bites  Dinner: protein + vegetables    Protein Supplements: 0 per day.  Snacking: Adequate. Snacks include healthy choices.    Vegetables: Likes a variety. Eats daily.  Fruits: Likes a variety. Eats once a week.  Beverages: water, diet soda and diet tea  Dining Out: Dining out: Weekly. Mostly restaurants.  Cooking at home: Daily. Mostly baked and grilled meat, fish and vegetables.    CURRENT EXERCISE:  Adequate. walking 2-3 days a week    Vitamins / Minerals / Herbs:   MVI with iron    Food Allergies:   NKFA    Social:  School bus driber  Alcohol: None.  Smoking: None.    ASSESSMENT:  Patient demonstrates willingness to change lifestyle habits as evidenced by weight loss, good exercise, increased vegetables, healthier cooking at home and healthier snacking at home.    Doing fairly well with working on greatest challenges portions.    Patient is caregiver to a lot of people so she puts herself last.  She is having a hard time focusing on her health needs when a family member needs her.      She is disappointed in her progress this month stating she knows she got off track in the last week when she went to care for her sister and was eating what was available.  She knew she could not skip meals so she made  the best choices of available options.    Adequate calorie intake for modist weight.  Adequate protein intake.    PLAN:    Diet: Adjust diet plan.    Exercise: Increase.    Behavior Modification: increase exercise.  Bring foods with you.    Weight loss prior to surgery (5-10% TBW): -1 lbs    Return to clinic in one month.    SESSION TIME:  45 minutes

## 2022-04-22 ENCOUNTER — HOSPITAL ENCOUNTER (OUTPATIENT)
Dept: RADIOLOGY | Facility: HOSPITAL | Age: 57
Discharge: HOME | End: 2022-04-22
Attending: INTERNAL MEDICINE
Payer: COMMERCIAL

## 2022-04-22 DIAGNOSIS — R06.02 SHORTNESS OF BREATH: ICD-10-CM

## 2022-04-22 PROCEDURE — 71046 X-RAY EXAM CHEST 2 VIEWS: CPT | Mod: TC,PO

## 2022-05-26 ENCOUNTER — OFFICE VISIT (OUTPATIENT)
Dept: BARIATRICS | Facility: CLINIC | Age: 57
End: 2022-05-26
Payer: COMMERCIAL

## 2022-05-26 VITALS
DIASTOLIC BLOOD PRESSURE: 73 MMHG | HEART RATE: 85 BPM | WEIGHT: 236.63 LBS | SYSTOLIC BLOOD PRESSURE: 124 MMHG | TEMPERATURE: 98 F | RESPIRATION RATE: 16 BRPM | HEIGHT: 65 IN | BODY MASS INDEX: 39.42 KG/M2

## 2022-05-26 DIAGNOSIS — E66.01 MORBID OBESITY: Primary | ICD-10-CM

## 2022-05-26 DIAGNOSIS — I10 HYPERTENSION, UNSPECIFIED TYPE: ICD-10-CM

## 2022-05-26 DIAGNOSIS — G47.33 OSA (OBSTRUCTIVE SLEEP APNEA): ICD-10-CM

## 2022-05-26 PROCEDURE — 3008F BODY MASS INDEX DOCD: CPT | Mod: CPTII,S$GLB,, | Performed by: SURGERY

## 2022-05-26 PROCEDURE — 99213 PR OFFICE/OUTPT VISIT, EST, LEVL III, 20-29 MIN: ICD-10-PCS | Mod: S$GLB,,, | Performed by: SURGERY

## 2022-05-26 PROCEDURE — 3074F SYST BP LT 130 MM HG: CPT | Mod: CPTII,S$GLB,, | Performed by: SURGERY

## 2022-05-26 PROCEDURE — 99999 PR PBB SHADOW E&M-EST. PATIENT-LVL III: ICD-10-PCS | Mod: PBBFAC,,, | Performed by: SURGERY

## 2022-05-26 PROCEDURE — 3074F PR MOST RECENT SYSTOLIC BLOOD PRESSURE < 130 MM HG: ICD-10-PCS | Mod: CPTII,S$GLB,, | Performed by: SURGERY

## 2022-05-26 PROCEDURE — 99999 PR PBB SHADOW E&M-EST. PATIENT-LVL III: CPT | Mod: PBBFAC,,, | Performed by: SURGERY

## 2022-05-26 PROCEDURE — 3008F PR BODY MASS INDEX (BMI) DOCUMENTED: ICD-10-PCS | Mod: CPTII,S$GLB,, | Performed by: SURGERY

## 2022-05-26 PROCEDURE — 4010F PR ACE/ARB THEARPY RXD/TAKEN: ICD-10-PCS | Mod: CPTII,S$GLB,, | Performed by: SURGERY

## 2022-05-26 PROCEDURE — 3078F PR MOST RECENT DIASTOLIC BLOOD PRESSURE < 80 MM HG: ICD-10-PCS | Mod: CPTII,S$GLB,, | Performed by: SURGERY

## 2022-05-26 PROCEDURE — 4010F ACE/ARB THERAPY RXD/TAKEN: CPT | Mod: CPTII,S$GLB,, | Performed by: SURGERY

## 2022-05-26 PROCEDURE — 3078F DIAST BP <80 MM HG: CPT | Mod: CPTII,S$GLB,, | Performed by: SURGERY

## 2022-05-26 PROCEDURE — 99213 OFFICE O/P EST LOW 20 MIN: CPT | Mod: S$GLB,,, | Performed by: SURGERY

## 2022-05-26 NOTE — PROGRESS NOTES
Medically Supervised Weight Loss Documentation    Date of Visit: 05/26/2022    Patient: Rhiannon Rangel    Current Weight: 236  Current BMI: Body mass index is 39.38 kg/m².  Weight Change: - 8 pounds     Last Weight: 244    Beginning Weight: 244      Vitals:   Vitals:    05/26/22 1001   BP: 124/73   Pulse: 85   Resp: 16   Temp: 98.1 °F (36.7 °C)       Comorbidities:   Past Medical History:   Diagnosis Date    Hernia, hiatal     Hypertension     Sjogren's disease        Medications:  Current Outpatient Medications on File Prior to Visit   Medication Sig Dispense Refill    alendronate (FOSAMAX) 70 MG tablet Take 70 mg by mouth every 7 days.       amLODIPine (NORVASC) 2.5 MG tablet Take 2.5 mg by mouth once daily.      BYSTOLIC 5 mg Tab Take 5 mg by mouth once daily.      CALCIUM CARB-MAG OXIDE-ZINC OX ORAL Take by mouth.      cevimeline (EVOXAC) 30 mg capsule Take 30 mg by mouth 4 (four) times daily.   1    escitalopram oxalate (LEXAPRO) 20 MG tablet Take 10 mg by mouth once daily.  4    irbesartan (AVAPRO) 300 MG tablet Take 300 mg by mouth once daily.       omeprazole (PRILOSEC) 20 MG capsule Take 20 mg by mouth once daily.   2    RESTASIS 0.05 % ophthalmic emulsion INT 1 GTT IN OU BID  3    vitamin D (VITAMIN D3) 1000 units Tab Vitamin D3 1,000 unit tablet   Take 1 tablet every day by oral route.       No current facility-administered medications on file prior to visit.         Body comp:  Fat Percent:  51.8 %  Fat Mass:  122.6 lb  FFM:  114 lb  TBW: 83 lb  TBW %:  35.1 %  BMR: 1643 kcal      Diet Education Discussed:    Breakfast:  eggs  Lunch:  Meat and vegetable  Dinner:  Meat and vegetable  Was avoiding breads     Exercise/Activity Discussed:    Walking more    Behavior or Diet Goals for this patient:    Continue low carb dieting  Try to get back on track with dieting  Try to develop exercise routine     Labs  EKG  UGI -  dietary consult- done   psych consult - done  Seminar  EGD- endoscopy  done Dr. Short- reportedly no problems - will request  Tested positive sleep apnea, couldn't tolerate mask and machine  Planning for surgery in october     I will obtain the following clearances prior to surgery: cardiology         : total time spent for visit: 20 minutes

## 2022-05-30 ENCOUNTER — HOSPITAL ENCOUNTER (OUTPATIENT)
Dept: RADIOLOGY | Facility: HOSPITAL | Age: 57
Discharge: HOME | End: 2022-05-30
Attending: SURGERY
Payer: COMMERCIAL

## 2022-05-30 DIAGNOSIS — E66.01 MORBID OBESITY: ICD-10-CM

## 2022-05-30 PROCEDURE — A9698 NON-RAD CONTRAST MATERIALNOC: HCPCS | Performed by: SURGERY

## 2022-05-30 PROCEDURE — 74240 FL UPPER GI: ICD-10-PCS | Mod: 26,,, | Performed by: RADIOLOGY

## 2022-05-30 PROCEDURE — 25500020 PHARM REV CODE 255: Performed by: SURGERY

## 2022-05-30 PROCEDURE — 74240 X-RAY XM UPR GI TRC 1CNTRST: CPT | Mod: TC,FY

## 2022-05-30 PROCEDURE — 74240 X-RAY XM UPR GI TRC 1CNTRST: CPT | Mod: 26,,, | Performed by: RADIOLOGY

## 2022-05-30 RX ADMIN — BARIUM SULFATE 300 ML: 0.6 SUSPENSION ORAL at 09:05

## 2022-05-31 ENCOUNTER — OFFICE VISIT (OUTPATIENT)
Dept: CARDIOLOGY | Facility: CLINIC | Age: 57
End: 2022-05-31
Payer: COMMERCIAL

## 2022-05-31 ENCOUNTER — LAB VISIT (OUTPATIENT)
Dept: LAB | Facility: HOSPITAL | Age: 57
End: 2022-05-31
Attending: INTERNAL MEDICINE
Payer: COMMERCIAL

## 2022-05-31 VITALS
HEART RATE: 81 BPM | SYSTOLIC BLOOD PRESSURE: 122 MMHG | BODY MASS INDEX: 40.16 KG/M2 | RESPIRATION RATE: 19 BRPM | HEIGHT: 65 IN | OXYGEN SATURATION: 96 % | DIASTOLIC BLOOD PRESSURE: 85 MMHG | WEIGHT: 241.06 LBS

## 2022-05-31 DIAGNOSIS — R94.31 NONSPECIFIC ABNORMAL ELECTROCARDIOGRAM (ECG) (EKG): ICD-10-CM

## 2022-05-31 DIAGNOSIS — I10 PRIMARY HYPERTENSION: ICD-10-CM

## 2022-05-31 DIAGNOSIS — Z82.49 FAMILY HISTORY OF PREMATURE CAD: ICD-10-CM

## 2022-05-31 DIAGNOSIS — G47.33 OSA (OBSTRUCTIVE SLEEP APNEA): ICD-10-CM

## 2022-05-31 DIAGNOSIS — K76.0 NAFLD (NONALCOHOLIC FATTY LIVER DISEASE): ICD-10-CM

## 2022-05-31 DIAGNOSIS — R73.03 PREDIABETES: ICD-10-CM

## 2022-05-31 DIAGNOSIS — E66.01 MORBID OBESITY: ICD-10-CM

## 2022-05-31 DIAGNOSIS — Z01.810 PREOP CARDIOVASCULAR EXAM: Primary | ICD-10-CM

## 2022-05-31 DIAGNOSIS — U07.1 COVID-19: ICD-10-CM

## 2022-05-31 DIAGNOSIS — Z01.810 PREOP CARDIOVASCULAR EXAM: ICD-10-CM

## 2022-05-31 PROBLEM — Z91.89 CARDIOVASCULAR EVENT RISK: Status: ACTIVE | Noted: 2022-05-31

## 2022-05-31 LAB
CHOLEST SERPL-MCNC: 250 MG/DL (ref 120–199)
CHOLEST/HDLC SERPL: 5.4 {RATIO} (ref 2–5)
HDLC SERPL-MCNC: 46 MG/DL (ref 40–75)
HDLC SERPL: 18.4 % (ref 20–50)
LDLC SERPL CALC-MCNC: 173.8 MG/DL (ref 63–159)
NONHDLC SERPL-MCNC: 204 MG/DL
TRIGL SERPL-MCNC: 151 MG/DL (ref 30–150)

## 2022-05-31 PROCEDURE — 93000 EKG 12-LEAD: ICD-10-PCS | Mod: S$GLB,,, | Performed by: INTERNAL MEDICINE

## 2022-05-31 PROCEDURE — 1159F PR MEDICATION LIST DOCUMENTED IN MEDICAL RECORD: ICD-10-PCS | Mod: S$GLB,,, | Performed by: INTERNAL MEDICINE

## 2022-05-31 PROCEDURE — 93000 ELECTROCARDIOGRAM COMPLETE: CPT | Mod: S$GLB,,, | Performed by: INTERNAL MEDICINE

## 2022-05-31 PROCEDURE — 3074F SYST BP LT 130 MM HG: CPT | Mod: S$GLB,,, | Performed by: INTERNAL MEDICINE

## 2022-05-31 PROCEDURE — 3074F PR MOST RECENT SYSTOLIC BLOOD PRESSURE < 130 MM HG: ICD-10-PCS | Mod: S$GLB,,, | Performed by: INTERNAL MEDICINE

## 2022-05-31 PROCEDURE — 3008F BODY MASS INDEX DOCD: CPT | Mod: S$GLB,,, | Performed by: INTERNAL MEDICINE

## 2022-05-31 PROCEDURE — 3079F DIAST BP 80-89 MM HG: CPT | Mod: S$GLB,,, | Performed by: INTERNAL MEDICINE

## 2022-05-31 PROCEDURE — 99245 OFF/OP CONSLTJ NEW/EST HI 55: CPT | Mod: 25,S$GLB,, | Performed by: INTERNAL MEDICINE

## 2022-05-31 PROCEDURE — 99999 PR PBB SHADOW E&M-EST. PATIENT-LVL IV: ICD-10-PCS | Mod: PBBFAC,,, | Performed by: INTERNAL MEDICINE

## 2022-05-31 PROCEDURE — 99999 PR PBB SHADOW E&M-EST. PATIENT-LVL IV: CPT | Mod: PBBFAC,,, | Performed by: INTERNAL MEDICINE

## 2022-05-31 PROCEDURE — 4010F ACE/ARB THERAPY RXD/TAKEN: CPT | Mod: S$GLB,,, | Performed by: INTERNAL MEDICINE

## 2022-05-31 PROCEDURE — 3008F PR BODY MASS INDEX (BMI) DOCUMENTED: ICD-10-PCS | Mod: S$GLB,,, | Performed by: INTERNAL MEDICINE

## 2022-05-31 PROCEDURE — 99245 PR OFFICE CONSULTATION,LEVEL V: ICD-10-PCS | Mod: 25,S$GLB,, | Performed by: INTERNAL MEDICINE

## 2022-05-31 PROCEDURE — 36415 COLL VENOUS BLD VENIPUNCTURE: CPT | Performed by: INTERNAL MEDICINE

## 2022-05-31 PROCEDURE — 80061 LIPID PANEL: CPT | Performed by: INTERNAL MEDICINE

## 2022-05-31 PROCEDURE — 3079F PR MOST RECENT DIASTOLIC BLOOD PRESSURE 80-89 MM HG: ICD-10-PCS | Mod: S$GLB,,, | Performed by: INTERNAL MEDICINE

## 2022-05-31 PROCEDURE — 4010F PR ACE/ARB THEARPY RXD/TAKEN: ICD-10-PCS | Mod: S$GLB,,, | Performed by: INTERNAL MEDICINE

## 2022-05-31 PROCEDURE — 1159F MED LIST DOCD IN RCRD: CPT | Mod: S$GLB,,, | Performed by: INTERNAL MEDICINE

## 2022-05-31 RX ORDER — BUSPIRONE HYDROCHLORIDE 10 MG/1
10 TABLET ORAL 2 TIMES DAILY
COMMUNITY
Start: 2022-04-22

## 2022-05-31 NOTE — PROGRESS NOTES
Subjective:    Patient ID:  Rhiannon Rangel is a 56 y.o. female who presents for evaluation of clearance   Bariatric and referred by Sherrill Oseguera MD for pre-op bariatric, HTN. prediabetic  PCP: Igor Lock MD, [place]  No prior cardiologist  Lives with , Doug, non-smoker  FT     Patient is a new patient to me.    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  Nicotine: never  Alcohol: max 1 drink in any 24 hours.  Illicit drugs: none  Cardiac symptoms: none  Home BP: 128/82 on 2 antihypertensives.  Medication compliance: yes  Diet: regular, little salt  Caffeine: rare  Labs:   No results found for: TSH   No results found for: LABA1C, HGBA1C    Lab Results   Component Value Date    WBC 6.9 06/04/2021    HGB 12.9 06/04/2021    HCT 40.0 06/04/2021    MCV 86.0 06/04/2021     06/04/2021       CMP  Sodium   Date Value Ref Range Status   06/04/2021 138 135 - 146 mmol/L Final     Potassium   Date Value Ref Range Status   06/04/2021 4.1 3.5 - 5.3 mmol/L Final     Chloride   Date Value Ref Range Status   06/04/2021 103 98 - 110 mmol/L Final     CO2   Date Value Ref Range Status   06/04/2021 28 20 - 32 mmol/L Final     Glucose   Date Value Ref Range Status   06/04/2021 125 (H) 65 - 99 mg/dL Final     Comment:                   Fasting reference interval     For someone without known diabetes, a glucose value  between 100 and 125 mg/dL is consistent with  prediabetes and should be confirmed with a  follow-up test.          BUN   Date Value Ref Range Status   06/04/2021 15 7 - 25 mg/dL Final     Creatinine   Date Value Ref Range Status   06/04/2021 0.63 0.50 - 1.05 mg/dL Final     Comment:     For patients >49 years of age, the reference limit  for Creatinine is approximately 13% higher for  "people  identified as -American.          Calcium   Date Value Ref Range Status   06/04/2021 8.7 8.6 - 10.4 mg/dL Final     Total Protein   Date Value Ref Range Status   06/04/2021 6.2 6.1 - 8.1 g/dL Final     Albumin   Date Value Ref Range Status   06/04/2021 4.0 3.6 - 5.1 g/dL Final     Total Bilirubin   Date Value Ref Range Status   06/04/2021 0.4 0.2 - 1.2 mg/dL Final     Alkaline Phosphatase   Date Value Ref Range Status   11/25/2020 92 55 - 135 U/L Final     AST   Date Value Ref Range Status   06/04/2021 23 10 - 35 U/L Final     ALT   Date Value Ref Range Status   06/04/2021 37 (H) 6 - 29 U/L Final     Anion Gap   Date Value Ref Range Status   11/25/2020 7 (L) 8 - 16 mmol/L Final     eGFR if    Date Value Ref Range Status   06/04/2021 116 > OR = 60 mL/min/1.73m2 Final     eGFR if non    Date Value Ref Range Status   06/04/2021 100 > OR = 60 mL/min/1.73m2 Final     @labrcntip(troponini)@  No results found for: BNP} No results found for: CHOL  No results found for: HDL  No results found for: LDLCALC  No results found for: TRIG  No results found for: CHOLHDL     Last Echo: none  Last stress test: none  Cardiovascular angiogram: none  ECG: NSR, rate 74, late transition.  Fundoscopic exam: within the past year, negative for retinopathy    WF with history for HTN, class 3 obesity, prediabetic, NAFLD refer for pre-bariatric review. Denies any CV symptoms. Have premature family history for stroke in a sister at age 40.    Sherrill Oseguera MD noted "Morbid obesity    BMI 40.0-44.9, adult    CHANNING (obstructive sleep apnea)    Hypertension"    CXR 3/2022 - normal    Abdominal US 3/2020 - Fatty infiltration of the liver      Review of Systems   Constitutional: Positive for malaise/fatigue.   Eyes:        Dry eyes from Sjögren     Respiratory:        [place] score 5, tested positive for CHANNING but unable to tolerate the mask   Skin: Positive for dry skin and nail changes. " "  Gastrointestinal: Positive for constipation and diarrhea.   Psychiatric/Behavioral: Positive for memory loss.        Objective:    Physical Exam  Constitutional:       Appearance: She is well-developed.      Comments: RA O2 sat 97%   HENT:      Head: Normocephalic.   Eyes:      Conjunctiva/sclera: Conjunctivae normal.      Pupils: Pupils are equal, round, and reactive to light.   Neck:      Thyroid: No thyromegaly.      Vascular: No JVD.      Comments: Circumference 15"  Cardiovascular:      Rate and Rhythm: Normal rate and regular rhythm.      Pulses: Intact distal pulses.           Carotid pulses are 1+ on the right side and 1+ on the left side.       Radial pulses are 1+ on the right side and 1+ on the left side.        Dorsalis pedis pulses are 1+ on the right side and 1+ on the left side.        Posterior tibial pulses are 1+ on the right side and 1+ on the left side.      Heart sounds: Heart sounds are distant. No murmur heard.    No friction rub. No gallop.   Pulmonary:      Effort: Pulmonary effort is normal.      Breath sounds: Normal breath sounds. No rales.   Chest:      Chest wall: No tenderness.   Abdominal:      General: Bowel sounds are normal.      Palpations: Abdomen is soft.      Tenderness: There is no abdominal tenderness.      Comments: Waist 46", hip 49"   Musculoskeletal:         General: Normal range of motion.      Cervical back: Normal range of motion and neck supple.   Lymphadenopathy:      Cervical: No cervical adenopathy.   Skin:     General: Skin is warm and dry.      Findings: No rash.   Neurological:      Mental Status: She is alert and oriented to person, place, and time.           Assessment:       1. Preop cardiovascular exam    2. Morbid obesity    3. COVID-19, 2020    4. NAFLD (nonalcoholic fatty liver disease)    5. Primary hypertension, dx 2012    6. Prediabetes, dx 2021    7. Nonspecific abnormal electrocardiogram (ECG) (EKG)    8. Family history of premature stroke.    9. " CHANNING (obstructive sleep apnea), intolerant of CPAP         Plan:       Rhiannon was seen today for clearance .    Diagnoses and all orders for this visit:    Preop cardiovascular exam  -     IN OFFICE EKG 12-LEAD (to Muse)  -     Lipid Panel; Future  -     Echo; Future    Morbid obesity  -     Ambulatory referral/consult to Cardiology    COVID-19, 2020    NAFLD (nonalcoholic fatty liver disease)  -     Lipid Panel; Future    Primary hypertension, dx 2012  -     Microalbumin/Creatinine Ratio, Urine; Future  -     Echo; Future    Prediabetes, dx 2021  -     Microalbumin/Creatinine Ratio, Urine; Future  -     Echo; Future    Nonspecific abnormal electrocardiogram (ECG) (EKG)  -     Lipid Panel; Future  -     Echo; Future    Family history of premature stroke.  -     Lipid Panel; Future    CHANNING (obstructive sleep apnea), intolerant of CPAP    - All medical issues reviewed, continue current Rx.  - Warning signs of MI and stroke given, if symptoms last more than 5 minutes, stop immediately and call 911, then chew 2-4 low-dose ASA (81 mg).  - Clear for Surgery and anesthesia with acceptable risk from the cardiac standpoint. Will be at increase risk for complications secondary to patient's co-morbidities.  - CV status and all medications reviewed, patient acknowledge good understanding.  - Recommend healthy living: healthy diet and regular exercise aiming for fitness, restorative sleep and weight control  - Need good exercise program, 4 key elements: 1. Aerobic (walking, swimming, dancing, jogging, biking, etc, 2. Muscle strengthening / resistance exercise, need to do 2-3 times weekly, 3. Stretching daily, good stretch takes a whole  total minute. 4. Balance exercise daily.   - Instruction for Mediterranean diet and heart healthy exercise given.  - Check home blood pressure, 2 days weekly, do 2 readings within 5 minutes in AM and PM, keep log for review. Target resting BP is less than 130/85.  - Weigh twice weekly, try to lose  1-2 lbs per week. Target weight loss of 5%-10%.  - Highly recommend 30-60 minutes of exercise / activities daily, can have Sunday off, with 2-3 sessions of muscle strengthening weekly. A  would be very helpful.  - Recommend at least annual cardiovascular evaluation in view of patient's significant risk factors.  - Phone review / encourage use of [facility]      Greater than 50% of the time was spent in counseling and coordination of care. The above assessment and plan have been discussed at length. Referring provider's note reviewed. Labs and procedure over the last 6 months reviewed. Problem List updated. Asked to bring in all active medications / pills bottles with next visit.

## 2022-05-31 NOTE — PATIENT INSTRUCTIONS
Recommended Mediterranean dietEating Heart-Healthy Food: Using the DASH Plan  Eating for your heart doesnt have to be hard or boring. You just need to know how to make healthier choices. The DASH eating plan has been developed to help you do just that. DASH stands for Dietary Approaches to Stop Hypertension. It is a plan that has been proven to be healthier for your heart and to lower your risk for high blood pressure. It can also help lower your risk for cancer, heart disease, osteoporosis, and diabetes.  Choosing from Each Food Group  Choose foods from each of the food groups below each day. Try to get the recommended number of servings for each food group. The serving numbers are based on a diet of 2,000 calories a day. Talk to your doctor if youre unsure about your calorie needs.  Grains   Servings: 7-8 a day  A serving is:  1 slice bread  1 ounce dry cereal  half a cup cooked rice or pasta  Best choices: Whole grains and any grains high in fiber.  Vegetables   Servings: 4-5 a day  A serving is:  1 cup raw leafy vegetable  Half a cup cooked vegetable  Three-quarter cup vegetable juice  Best choices: Fresh or frozen vegetable prepared without too much added salt or fat.    Fruits   Servings: 4-5 a day  A serving is:  Three-quarter cup fruit juice  1 medium fruit  One-quarter cup dried fruit  One-half cup fresh, frozen, or canned fruit  Best choices: A variety of fresh fruits of different colors. Whole fruits are a much better choice than fruit juices.  Low-fat or Fat Free Dairy   Servings: 2-3 a day  A serving is:  8 ounces milk  1 cup yogurt  One and a half ounces cheese  Best choices: Skim or 1% milk, low-fat or fat free yogurt or buttermilk, and low-fat cheeses.       Meat, Poultry, Fish   Servings: 2 or fewer a day  A serving is:  3 ounces cooked meat, poultry, or fish  Best choices: Lean meats and fish. Trim away visible fat. Broil, roast, or boil instead of frying. Remove skin from poultry before eating.   Nuts, Seeds, Beans   Servings: 4-5 a week  A serving is:  One third cup nuts (or one and a half ounces)  2 tablespoons sunflower seeds  Half a cup cooked beans  Best choices: Dry roasted nuts with no salt added, lentils, kidney beans, garbanzo beans, and whole barnes beans.    Fats and Oils   Servings: 2 a day  A serving is:  1 teaspoon vegetable oil  1 teaspoon soft margarine  1 tablespoon low-fat mayonnaise  1 teaspoon regular mayonnaise  2 tablespoons light salad dressing  1 tablespoon regular salad dressing  Best choices: Monounsaturated and polyunsaturated fats such as olive, canola, or safflower oil.  Sweets   Servings: 5 a week or fewer  A serving is:  1 tablespoon sugar, maple syrup, or honey  1 tablespoon jam or jelly  1 half-ounce jelly beans (about 15)  8 ounces lemonade  Best choices: Dried fruit can be a satisfying sweet. Choose low-fat sweets when possible. And watch your serving sizes!       Aerobic Exercise for a Healthy Heart  Exercise is a lot more than an energy booster and a stress reliever. It also strengthens your heart muscle, lowers your blood pressure and blood cholesterol, and burns calories.      Remember, some activity is better than none.     Choose an Aerobic Activity  Choose a nonstop activity that makes your heart and lungs work harder than they do when you rest or walk normally. This aerobic exercise can improve the way your heart and other muscles use oxygen. Make it fun by exercising with a friend and choosing an activity you enjoy. Here are some ideas:  Walking  Swimming  Bicycling  Stair climbing  Dancing  Jogging  Exercise Regularly  If you havent been exercising regularly,  get your doctors okay first. Then start slowly.  Here are some tips:  Begin exercising 3 times a week for 5-10 minutes at a time.  When you feel comfortable, add a few minutes each week.  Slowly build up to exercising 3-4 times each week for 20-40 minutes. Aim for a total of 150 or more minutes a  week.  Be sure to carry your nitroglycerin with you when you exercise.  If you get angina when youre exercising, stop what youre doing, take your nitroglycerin, and call your doctor.  © 5095-7733 [employer], [address] [place] [address], [place] [address]. All rights reserved. This information is not intended as a substitute for professional medical care. Always follow your healthcare professional's instructions.    Losing Weight (Cardiovascular)  Excess weight is a major risk factor for heart disease. Losing weight may help keep your arteries open so that your heart can get the oxygen-rich blood it needs. Weight loss can also help lower your blood pressure and reduce your risk for diabetes. All in all, losing weight makes you healthier.          Exercise with a friend. When activity is fun, you're more likely to stick with it.        Calories and Weight Loss  Calories are the fuel your body burns for energy. You get the calories you need from the food you eat. For healthy weight loss, women should eat at least 1,200 calories a day, men at least 1,500.    When you eat more calories than you need, your body stores the extra calories as fat. One pound of fat equals 3,500 calories.    To lose weight, try to burn 500 calories a day more than you eat. To do this, eat 250 calories less each day. Add activity to burn the other 250 calories. Walking 21/2 miles burns about 250 calories.    Eat a variety of healthy foods. Its the best way to make calories count.     Tips for losing weight:  Drink 8 to 10 glasses of water a day.    Dont skip meals. Instead, eat smaller portions.       Brisk Activity Is Best  Brisk activity gets your heart pumping faster. It makes your heart healthier. Its also the best way to burn calories. In fact, your body may keep burning calories for hours after you stop a brisk activity.    Begin by walking 10 minutes most days.    Add more time and speed to your walk. Build up as you feel  able.    Try to walk briskly at least 30 minutes most days. If needed, you can break this into 2 shorter sessions.     Check off the ideas below that you could try to make your day more active:    Take the stairs instead of the elevator.    Park your car farther away and walk.    Ride a bike to work or to the store.    Walk laps around the mall.

## 2022-06-08 ENCOUNTER — TELEPHONE (OUTPATIENT)
Dept: CARDIOLOGY | Facility: CLINIC | Age: 57
End: 2022-06-08
Payer: COMMERCIAL

## 2022-06-08 NOTE — TELEPHONE ENCOUNTER
Pt states she is unable to complete echo due to cost. Informed pt that she was cleared per providers office note. Can post pone Echo when pt can afford. Sent cardiac clearance in staff message to Dr. Oseguera's staff.

## 2022-06-08 NOTE — TELEPHONE ENCOUNTER
----- Message from Chet Ernst sent at 6/8/2022  8:40 AM CDT -----  Contact: pt at [phone]  Type: Needs Medical Advice  Who Called:  pt  Best Call Back Number: [phone]  Additional Information: pt is calling about her appt on 6/10/22 due to the cost she may have to cancel it she can't pay the cost. Please call back and  advise.

## 2022-06-29 ENCOUNTER — CLINICAL SUPPORT (OUTPATIENT)
Dept: BARIATRICS | Facility: CLINIC | Age: 57
End: 2022-06-29
Payer: COMMERCIAL

## 2022-06-29 VITALS — HEIGHT: 65 IN | BODY MASS INDEX: 40.02 KG/M2 | WEIGHT: 240.19 LBS

## 2022-06-29 DIAGNOSIS — Z71.3 DIETARY COUNSELING AND SURVEILLANCE: ICD-10-CM

## 2022-06-29 DIAGNOSIS — I10 PRIMARY HYPERTENSION: ICD-10-CM

## 2022-06-29 DIAGNOSIS — R73.03 PREDIABETES: ICD-10-CM

## 2022-06-29 DIAGNOSIS — E66.9 OBESITY, CLASS II, BMI 35-39.9: ICD-10-CM

## 2022-06-29 PROCEDURE — 99999 PR PBB SHADOW E&M-EST. PATIENT-LVL III: ICD-10-PCS | Mod: PBBFAC,,, | Performed by: DIETITIAN, REGISTERED

## 2022-06-29 PROCEDURE — 99999 PR PBB SHADOW E&M-EST. PATIENT-LVL III: CPT | Mod: PBBFAC,,, | Performed by: DIETITIAN, REGISTERED

## 2022-06-29 PROCEDURE — 97803 PR MED NUTR THER, SUBSQ, INDIV, EA 15 MIN: ICD-10-PCS | Mod: S$GLB,,, | Performed by: DIETITIAN, REGISTERED

## 2022-06-29 PROCEDURE — 97803 MED NUTRITION INDIV SUBSEQ: CPT | Mod: S$GLB,,, | Performed by: DIETITIAN, REGISTERED

## 2022-06-29 NOTE — PROGRESS NOTES
NUTRITION NOTE    Referring Physician: Dr. Oseguera  Reason for MNT Referral: Medically Supervised Diet pending Gastric Sleeve    PAST MEDICAL HISTORY:    Patient presents for 4th visit for MSD with weight gain over the past month.    Past Medical History:   Diagnosis Date    Hernia, hiatal     Hypertension     Sjogren's disease        CLINICAL DATA:  56 y.o. female.  Height: 5'5  Current Weight: 240 lbs  Weight Change Since Initial Visit: -4 lbs  Ideal Body Weight: 141 lbs  BMI: 39.97    CURRENT DIET:  Diet Recall: Breakfast: protein shake  Lunch: leftovers  Dinner: protein + vegetable + side dish    Protein Supplements: 1 per day.  Snacking: Adequate. Snacks include healthy choices.    Vegetables: Likes a variety. Eats almost daily.  Fruits: Likes a variety. Eats once a week.  Beverages: water and sugar-free beverages. Coke Zero  Dining Out: Dining out: Weekly. Mostly restaurants and take-out.  Cooking at home: Daily. Mostly baked and grilled meat, fish, starchy CHO and vegetables.    CURRENT EXERCISE:  None    Vitamins / Minerals / Herbs:   MVI with iron    Food Allergies:   NKFA    Social:    Alcohol: None.  Smoking: None.    ASSESSMENT:  Patient demonstrates some willingness to change lifestyle habits as evidenced by dietary changes.    Doing fairly well with working on greatest challenges portion sizes.  Her current issue is she feels she has no time to prepare healthy meals for herself when she is too busy taking care of her grandchildren.  Excess calorie intake.  Adequate protein intake.    PLAN:    Diet: Adjust diet plan.    Exercise: Increase.    Behavior Modification: wrap with lean protein or frozen meal like Healthy Choice, Atkins, or Lean Cuisine for lunch and dinner.  Bring lunch box with own food.    Weight loss prior to surgery (5-10% TBW): -4 lbs    Return to clinic in one month.    SESSION TIME:  60 minutes

## 2022-07-19 ENCOUNTER — PATIENT MESSAGE (OUTPATIENT)
Dept: BARIATRICS | Facility: CLINIC | Age: 57
End: 2022-07-19
Payer: COMMERCIAL

## 2022-07-28 ENCOUNTER — CLINICAL SUPPORT (OUTPATIENT)
Dept: BARIATRICS | Facility: CLINIC | Age: 57
End: 2022-07-28
Payer: COMMERCIAL

## 2022-07-28 VITALS — WEIGHT: 236.75 LBS | HEIGHT: 65 IN | BODY MASS INDEX: 39.45 KG/M2

## 2022-07-28 DIAGNOSIS — Z71.3 DIETARY COUNSELING AND SURVEILLANCE: ICD-10-CM

## 2022-07-28 DIAGNOSIS — R73.03 PREDIABETES: ICD-10-CM

## 2022-07-28 DIAGNOSIS — I10 PRIMARY HYPERTENSION: ICD-10-CM

## 2022-07-28 DIAGNOSIS — E66.9 OBESITY, CLASS II, BMI 35-39.9: ICD-10-CM

## 2022-07-28 PROCEDURE — 97803 PR MED NUTR THER, SUBSQ, INDIV, EA 15 MIN: ICD-10-PCS | Mod: S$GLB,,, | Performed by: DIETITIAN, REGISTERED

## 2022-07-28 PROCEDURE — 99999 PR PBB SHADOW E&M-EST. PATIENT-LVL III: CPT | Mod: PBBFAC,,, | Performed by: DIETITIAN, REGISTERED

## 2022-07-28 PROCEDURE — 99999 PR PBB SHADOW E&M-EST. PATIENT-LVL III: ICD-10-PCS | Mod: PBBFAC,,, | Performed by: DIETITIAN, REGISTERED

## 2022-07-28 PROCEDURE — 97803 MED NUTRITION INDIV SUBSEQ: CPT | Mod: S$GLB,,, | Performed by: DIETITIAN, REGISTERED

## 2022-07-28 NOTE — PROGRESS NOTES
NUTRITION NOTE    Referring Physician: Dr. Oseguera  Reason for MNT Referral: Medically Supervised Diet pending Gastric Sleeve    PAST MEDICAL HISTORY:    Patient presents for 5th visit for MSD with weight loss over the past month; total weight loss by making numerous dietary and lifestyle changes.    Past Medical History:   Diagnosis Date    Hernia, hiatal     Hypertension     Sjogren's disease        CLINICAL DATA:  57 y.o. female.  Height: 5'5  Current Weight: 236 lbs  Weight Change Since Initial Visit: -8 lbs  Ideal Body Weight: 125 lbs  BMI: 39.4    CURRENT DIET:  Diet Recall: Breakfast: Fairlife protein shake, boiled egg  Eats the same thing for both lunch and dinner, but has two separate small meals.  Lunch and dinner: white chicken chili, salad with chicken, chicken spaghetti, wrap with greek yogurt, protein + vegetable    Diet Includes:   Meal Pattern: Improved.  Protein Supplements: 0-1 per day.  Snacking: Adequate. Snacks include healthy choices.    Vegetables: Likes a variety. Eats daily.  Fruits: Likes a variety. Eats 2-3 times per week.  Beverages: water and sugar-free beverages  Dining Out: Dining out: Monthly. Mostly restaurants.  Cooking at home: Daily. Mostly baked and grilled meat, fish and vegetables.    CURRENT EXERCISE:  Adequate. Walking with weights. Resistance bands.    Vitamins / Minerals / Herbs:   MVI with iron, 1000 IU vitamin D     Food Allergies:   NKFA    Social:  Works regular daytime shifts.  Alcohol: None.  Smoking: None.    ASSESSMENT:  Patient demonstrates all willingness to change lifestyle habits as evidenced by weight loss, good exercise, including protein drinks, increased fruits, increased vegetables, more food preparation at tami and healthier cooking at home.    Doing well with working on greatest challenges portion sizes, skipping meals to take care of others.    Adequate calorie intake.  Adequate protein intake.    PLAN:    Diet: Maintain diet plan.    Behavior  Modification: Continue to increase exercise.    Weight loss prior to surgery (5-10% TBW): -8 lbs    Return to clinic in one month.    SESSION TIME:  60 minutes

## 2022-08-29 ENCOUNTER — OFFICE VISIT (OUTPATIENT)
Dept: BARIATRICS | Facility: CLINIC | Age: 57
End: 2022-08-29
Payer: COMMERCIAL

## 2022-08-29 VITALS
DIASTOLIC BLOOD PRESSURE: 69 MMHG | SYSTOLIC BLOOD PRESSURE: 113 MMHG | RESPIRATION RATE: 16 BRPM | HEIGHT: 65 IN | HEART RATE: 84 BPM | TEMPERATURE: 98 F | WEIGHT: 227.81 LBS | BODY MASS INDEX: 37.95 KG/M2

## 2022-08-29 DIAGNOSIS — Z71.3 PRE-BARIATRIC SURGERY NUTRITION EVALUATION: ICD-10-CM

## 2022-08-29 DIAGNOSIS — E66.01 MORBID OBESITY: Primary | ICD-10-CM

## 2022-08-29 DIAGNOSIS — Z01.818 PREOP EXAMINATION: ICD-10-CM

## 2022-08-29 DIAGNOSIS — I10 HYPERTENSION, UNSPECIFIED TYPE: ICD-10-CM

## 2022-08-29 DIAGNOSIS — K21.9 GASTROESOPHAGEAL REFLUX DISEASE, UNSPECIFIED WHETHER ESOPHAGITIS PRESENT: ICD-10-CM

## 2022-08-29 DIAGNOSIS — K76.0 NAFLD (NONALCOHOLIC FATTY LIVER DISEASE): ICD-10-CM

## 2022-08-29 DIAGNOSIS — E66.9 OBESITY, CLASS II, BMI 35-39.9: ICD-10-CM

## 2022-08-29 DIAGNOSIS — G47.33 OSA (OBSTRUCTIVE SLEEP APNEA): ICD-10-CM

## 2022-08-29 DIAGNOSIS — R73.03 PREDIABETES: ICD-10-CM

## 2022-08-29 PROCEDURE — 3066F PR DOCUMENTATION OF TREATMENT FOR NEPHROPATHY: ICD-10-PCS | Mod: CPTII,S$GLB,, | Performed by: NURSE PRACTITIONER

## 2022-08-29 PROCEDURE — 4010F PR ACE/ARB THEARPY RXD/TAKEN: ICD-10-PCS | Mod: CPTII,S$GLB,, | Performed by: NURSE PRACTITIONER

## 2022-08-29 PROCEDURE — 99213 OFFICE O/P EST LOW 20 MIN: CPT | Mod: S$GLB,,, | Performed by: NURSE PRACTITIONER

## 2022-08-29 PROCEDURE — 1159F PR MEDICATION LIST DOCUMENTED IN MEDICAL RECORD: ICD-10-PCS | Mod: CPTII,S$GLB,, | Performed by: NURSE PRACTITIONER

## 2022-08-29 PROCEDURE — 99999 PR PBB SHADOW E&M-EST. PATIENT-LVL V: CPT | Mod: PBBFAC,,, | Performed by: NURSE PRACTITIONER

## 2022-08-29 PROCEDURE — 3061F PR NEG MICROALBUMINURIA RESULT DOCUMENTED/REVIEW: ICD-10-PCS | Mod: CPTII,S$GLB,, | Performed by: NURSE PRACTITIONER

## 2022-08-29 PROCEDURE — 3078F DIAST BP <80 MM HG: CPT | Mod: CPTII,S$GLB,, | Performed by: NURSE PRACTITIONER

## 2022-08-29 PROCEDURE — 1160F RVW MEDS BY RX/DR IN RCRD: CPT | Mod: CPTII,S$GLB,, | Performed by: NURSE PRACTITIONER

## 2022-08-29 PROCEDURE — 3078F PR MOST RECENT DIASTOLIC BLOOD PRESSURE < 80 MM HG: ICD-10-PCS | Mod: CPTII,S$GLB,, | Performed by: NURSE PRACTITIONER

## 2022-08-29 PROCEDURE — 3061F NEG MICROALBUMINURIA REV: CPT | Mod: CPTII,S$GLB,, | Performed by: NURSE PRACTITIONER

## 2022-08-29 PROCEDURE — 99999 PR PBB SHADOW E&M-EST. PATIENT-LVL V: ICD-10-PCS | Mod: PBBFAC,,, | Performed by: NURSE PRACTITIONER

## 2022-08-29 PROCEDURE — 1125F AMNT PAIN NOTED PAIN PRSNT: CPT | Mod: CPTII,S$GLB,, | Performed by: NURSE PRACTITIONER

## 2022-08-29 PROCEDURE — 1160F PR REVIEW ALL MEDS BY PRESCRIBER/CLIN PHARMACIST DOCUMENTED: ICD-10-PCS | Mod: CPTII,S$GLB,, | Performed by: NURSE PRACTITIONER

## 2022-08-29 PROCEDURE — 3074F SYST BP LT 130 MM HG: CPT | Mod: CPTII,S$GLB,, | Performed by: NURSE PRACTITIONER

## 2022-08-29 PROCEDURE — 3008F BODY MASS INDEX DOCD: CPT | Mod: CPTII,S$GLB,, | Performed by: NURSE PRACTITIONER

## 2022-08-29 PROCEDURE — 1159F MED LIST DOCD IN RCRD: CPT | Mod: CPTII,S$GLB,, | Performed by: NURSE PRACTITIONER

## 2022-08-29 PROCEDURE — 1125F PR PAIN SEVERITY QUANTIFIED, PAIN PRESENT: ICD-10-PCS | Mod: CPTII,S$GLB,, | Performed by: NURSE PRACTITIONER

## 2022-08-29 PROCEDURE — 3074F PR MOST RECENT SYSTOLIC BLOOD PRESSURE < 130 MM HG: ICD-10-PCS | Mod: CPTII,S$GLB,, | Performed by: NURSE PRACTITIONER

## 2022-08-29 PROCEDURE — 99213 PR OFFICE/OUTPT VISIT, EST, LEVL III, 20-29 MIN: ICD-10-PCS | Mod: S$GLB,,, | Performed by: NURSE PRACTITIONER

## 2022-08-29 PROCEDURE — 3066F NEPHROPATHY DOC TX: CPT | Mod: CPTII,S$GLB,, | Performed by: NURSE PRACTITIONER

## 2022-08-29 PROCEDURE — 4010F ACE/ARB THERAPY RXD/TAKEN: CPT | Mod: CPTII,S$GLB,, | Performed by: NURSE PRACTITIONER

## 2022-08-29 PROCEDURE — 3008F PR BODY MASS INDEX (BMI) DOCUMENTED: ICD-10-PCS | Mod: CPTII,S$GLB,, | Performed by: NURSE PRACTITIONER

## 2022-08-29 RX ORDER — TIRZEPATIDE 5 MG/.5ML
INJECTION, SOLUTION SUBCUTANEOUS
COMMUNITY
Start: 2022-08-11

## 2022-08-29 RX ORDER — DOXYCYCLINE 40 MG/1
40 CAPSULE ORAL DAILY
COMMUNITY
Start: 2022-08-01

## 2022-08-29 NOTE — PATIENT INSTRUCTIONS
Stool softeners 2x daily    Pre op Diet-     Breakfast- protein shake  Lunch- protein shake  Dinner- 3 ounces of meat and vegetables (from list)    NO SNACKING  Only water to drink

## 2022-08-29 NOTE — PROGRESS NOTES
Bariatric  History & Physical    SUBJECTIVE:     Chief Complaint   Patient presents with    Obesity    Follow-up    Nutrition Counseling       History of Present Illness:    Patient is a 57 y.o. female who is referred for evaluation of surgical treatment of morbid obesity. Her Body mass index is 37.91 kg/m². She has known comorbidities of diabetes mellitus, GERD, hypertension, obstructive sleep apnea, and NAFLD . She has attended the bariatric seminar and is most interested in gastric sleeve surgery.    Review of patient's allergies indicates:   Allergen Reactions    Demerol [meperidine] Nausea And Vomiting       Past Medical History:   Diagnosis Date    Hernia, hiatal     Hypertension     Sjogren's disease      Past Surgical History:   Procedure Laterality Date    KNEE ARTHROSCOPY W/ MENISCAL REPAIR Left 2020    LAPAROSCOPIC CHOLECYSTECTOMY N/A 11/30/2020    Procedure: CHOLECYSTECTOMY, LAPAROSCOPIC;  Surgeon: Shagufta Osuna MD;  Location: [facility];  Service: General;  Laterality: N/A;    SHOULDER SURGERY Left      Family History   Problem Relation Age of Onset    Hypertension Mother     Hyperlipidemia Mother     Cancer Father         Lung CA    Hypertension Father     Hyperlipidemia Father     Diabetes Maternal Aunt     Obesity Paternal Aunt      Social History     Tobacco Use    Smoking status: Never    Smokeless tobacco: Never   Substance Use Topics    Alcohol use: No    Drug use: No        Current Outpatient Medications   Medication Sig Dispense Refill    alendronate (FOSAMAX) 70 MG tablet Take 70 mg by mouth every 7 days.       amLODIPine (NORVASC) 2.5 MG tablet Take 2.5 mg by mouth once daily.      busPIRone (BUSPAR) 10 MG tablet Take 10 mg by mouth 2 (two) times daily.      CALCIUM CARB-MAG OXIDE-ZINC OX ORAL Take by mouth.      cevimeline (EVOXAC) 30 mg capsule Take 30 mg by mouth 4 (four) times daily.   1    escitalopram oxalate (LEXAPRO) 20 MG tablet Take 10 mg by mouth once daily.  4    irbesartan (AVAPRO)  300 MG tablet Take 300 mg by mouth once daily.       MOUNJARO 5 mg/0.5 mL PnIj Inject into the skin.      omeprazole (PRILOSEC) 20 MG capsule Take 20 mg by mouth once daily.   2    ORACEA 40 mg capsule Take 40 mg by mouth once daily.      RESTASIS 0.05 % ophthalmic emulsion INT 1 GTT IN OU BID  3    vitamin D (VITAMIN D3) 1000 units Tab Vitamin D3 1,000 unit tablet   Take 1 tablet every day by oral route.       No current facility-administered medications for this visit.         Beginning Weight: 244 lbs    Last Weight: 236 lbs    Current Weight: 227 lbs   Weight Change: -17 lbs    Body comp:  Fat Percent:  50.1 %  Fat Mass:  114.2 lb  FFM:  113.6 lb  TBW: 82.2 lb  TBW %:  36.1 %  BMR: 1623 kcal    Wt Readings from Last 10 Encounters:   08/29/22 103.3 kg (227 lb 12.8 oz)   07/28/22 107.4 kg (236 lb 12.4 oz)   06/29/22 109 kg (240 lb 3.1 oz)   05/31/22 109.4 kg (241 lb 1.2 oz)   05/26/22 107.3 kg (236 lb 10.1 oz)   04/20/22 (P) 110.5 kg (243 lb 9.7 oz)   03/30/22 (P) 111.4 kg (245 lb 9.5 oz)   03/23/22 110.9 kg (244 lb 6.4 oz)   07/06/21 110.9 kg (244 lb 6.4 oz)   05/27/21 109.4 kg (241 lb 3.2 oz)         Diet Education Discussed:  Recommend high protein, low carb meals- mainly meats and vegetables.    Breakfast:  banana and protein shake  Lunch:  baked salmon, asapagus   Dinner:  baked chicken, broccoli, baked beans  Water: < 64 oz    Exercise: 3 days per week for 40 minutes of walking    MVI: womens one per day, vit d, calcium, zinc, mag, Nuriva     Review of Systems:  Review of Systems   Constitutional:  Positive for appetite change and fatigue.   HENT:  Positive for congestion.    Respiratory:  Positive for apnea and cough.    Gastrointestinal:  Positive for nausea.   Genitourinary:  Positive for urgency.   Musculoskeletal:  Positive for joint swelling.   Neurological:  Positive for headaches.   All other systems reviewed and are negative.    OBJECTIVE:     Vital Signs (Most Recent)  Temp: 97.8 °F (36.6 °C)  "(08/29/22 0929)  Pulse: 84 (08/29/22 0929)  Resp: 16 (08/29/22 0929)  BP: 113/69 (08/29/22 0929)  5' 5" (1.651 m)  103.3 kg (227 lb 12.8 oz)       Chart review:  Primary Care Physician: Ashvin      Lab review:  Most Recent Data:  CBC:   Lab Results   Component Value Date    WBC 6.9 06/04/2021    HGB 12.9 06/04/2021    HCT 40.0 06/04/2021     06/04/2021    MCV 86.0 06/04/2021    RDW 14.9 06/04/2021     BMP:   Lab Results   Component Value Date     06/04/2021    K 4.1 06/04/2021     06/04/2021    CO2 28 06/04/2021    BUN 15 06/04/2021    CREATININE 0.63 06/04/2021     (H) 06/04/2021    CALCIUM 8.7 06/04/2021     LFTs:   Lab Results   Component Value Date    PROT 6.2 06/04/2021    ALBUMIN 4.0 06/04/2021    BILITOT 0.4 06/04/2021    AST 23 06/04/2021    ALKPHOS 92 11/25/2020    ALT 37 (H) 06/04/2021     Coags: No results found for: INR, PROTIME, PTT  FLP:   Lab Results   Component Value Date    CHOL 250 (H) 05/31/2022    HDL 46 05/31/2022    LDLCALC 173.8 (H) 05/31/2022    TRIG 151 (H) 05/31/2022    CHOLHDL 18.4 (L) 05/31/2022     DM:   Lab Results   Component Value Date    LDLCALC 173.8 (H) 05/31/2022    CREATININE 0.63 06/04/2021     Thyroid: No results found for: TSH, FREET4, C0VNAMQ, D6PWWAQ, THYROIDAB  Anemia: No results found for: IRON, TIBC, FERRITIN, ISVGMBXU45, FOLATE  Cardiac: No results found for: TROPONINI, CKTOTAL, CKMB, BNP  Urinalysis:   Lab Results   Component Value Date    COLORU Yellow 11/30/2020    SPECGRAV 1.025 11/30/2020    NITRITE Negative 11/30/2020    GLUCOSEU NEGATIVE 06/07/2019    KETONESU Negative 11/30/2020    UROBILINOGEN Negative 11/30/2020    BILIRUBINUR NEGATIVE 06/07/2019         Radiology review: Images independently reviewed and interpreted.    UGI- Small hiatal hernia and trace gastroesophageal reflux.      Other Results:  EKG (my interpretation): normal EKG, normal sinus rhythm, unchanged from previous tracings.    Physical Exam:  Physical Exam  Vitals and " nursing note reviewed.   Constitutional:       General: She is not in acute distress.     Appearance: Normal appearance. She is obese. She is not ill-appearing or toxic-appearing.   HENT:      Head: Normocephalic and atraumatic.      Right Ear: External ear normal.      Left Ear: External ear normal.      Nose: Nose normal. No congestion or rhinorrhea.      Mouth/Throat:      Mouth: Mucous membranes are moist.      Pharynx: Oropharynx is clear.   Eyes:      General:         Right eye: No discharge.         Left eye: No discharge.      Conjunctiva/sclera: Conjunctivae normal.   Cardiovascular:      Rate and Rhythm: Normal rate and regular rhythm.      Pulses: Normal pulses.      Heart sounds: Normal heart sounds. No murmur heard.  Pulmonary:      Effort: Pulmonary effort is normal. No respiratory distress.      Breath sounds: Normal breath sounds. No stridor. No wheezing.   Chest:      Chest wall: No tenderness.   Abdominal:      General: Bowel sounds are normal. There is no distension.      Palpations: There is no mass.      Tenderness: There is no abdominal tenderness. There is no guarding.      Hernia: No hernia is present.   Musculoskeletal:         General: No swelling, tenderness, deformity or signs of injury. Normal range of motion.      Right lower leg: No edema.      Left lower leg: No edema.   Skin:     General: Skin is warm and dry.      Capillary Refill: Capillary refill takes less than 2 seconds.      Coloration: Skin is not jaundiced or pale.      Findings: No bruising, erythema or rash.   Neurological:      General: No focal deficit present.      Mental Status: She is oriented to person, place, and time.      Motor: No weakness.      Gait: Gait normal.   Psychiatric:         Mood and Affect: Mood normal.         Behavior: Behavior normal.         Thought Content: Thought content normal.         Judgment: Judgment normal.     ASSESSMENT/PLAN:        Labs: need if continuing with surgery, will  re-evaluate after colonoscopy  UGI: done  EKG:  done  Endoscopy: done  Psych consult: done  Dietary consult: done  Seminar: done  Colonoscopy- October    Will obtain the following clearances prior to surgery:   Cardiology- done- 5/31/22      1. Morbid obesity        2. Preop examination        3. Pre-bariatric surgery nutrition evaluation        4. Obesity, Class II, BMI 35-39.9        5. Hypertension, unspecified type        6. CHANNING (obstructive sleep apnea)        7. NAFLD (nonalcoholic fatty liver disease)        8. Prediabetes, dx 2021        9. Gastroesophageal reflux disease, unspecified whether esophagitis present                Plan:  Rhiannon Rangel has morbid obesity as their Body mass index is 37.91 kg/m². She would benefit from weight loss surgery and has chosen  dieting with possible sleeve  as the preferred procedure. She understands that this is a tool and lifestyle change will be necessary to keep weight off. I went over possible complications of the chosen surgery with the patient and she is agreeable to surgery.    Recently added mounjaro Subq to medication regimen for additional weight loss      The patient has been taught the post operative diet and understands that she will need to stay on this diet to prevent complication.  They are aware of the post operative follow up and return to see me after surgery to treat/prevent/identify any complications.  she has been advised to attend support groups post operatively.

## 2023-07-07 ENCOUNTER — TELEPHONE (OUTPATIENT)
Dept: DERMATOLOGY | Facility: CLINIC | Age: 58
End: 2023-07-07
Payer: COMMERCIAL

## 2023-07-07 NOTE — TELEPHONE ENCOUNTER
PT contacted and first available appt offered along with being added to the wait list and pt declined.

## 2023-07-07 NOTE — TELEPHONE ENCOUNTER
----- Message from Jacklyn Kaminski sent at 7/7/2023 11:22 AM CDT -----  Regarding: Patient Returning Call  Contact: patient at [phone]  Type:  Patient Returning Call    Who Called:  patient at [phone]    Who Left Message for Patient:  Soo Pitts RN   Does the patient know what this is regarding?:  yes    Additional Information:  Please call and advise. Thank you

## 2025-01-26 ENCOUNTER — OFFICE VISIT (OUTPATIENT)
Dept: URGENT CARE | Facility: CLINIC | Age: 60
End: 2025-01-26
Payer: COMMERCIAL

## 2025-01-26 VITALS
OXYGEN SATURATION: 97 % | DIASTOLIC BLOOD PRESSURE: 89 MMHG | HEART RATE: 77 BPM | RESPIRATION RATE: 17 BRPM | WEIGHT: 220 LBS | BODY MASS INDEX: 36.65 KG/M2 | TEMPERATURE: 98 F | SYSTOLIC BLOOD PRESSURE: 130 MMHG | HEIGHT: 65 IN

## 2025-01-26 DIAGNOSIS — B96.89 ACUTE BACTERIAL PHARYNGITIS: Primary | ICD-10-CM

## 2025-01-26 DIAGNOSIS — R68.89 FLU-LIKE SYMPTOMS: ICD-10-CM

## 2025-01-26 DIAGNOSIS — J02.8 ACUTE BACTERIAL PHARYNGITIS: Primary | ICD-10-CM

## 2025-01-26 LAB
CTP QC/QA: YES
POC MOLECULAR INFLUENZA A AGN: NEGATIVE
POC MOLECULAR INFLUENZA B AGN: NEGATIVE

## 2025-01-26 PROCEDURE — 99203 OFFICE O/P NEW LOW 30 MIN: CPT | Mod: S$GLB,,, | Performed by: NURSE PRACTITIONER

## 2025-01-26 PROCEDURE — 87502 INFLUENZA DNA AMP PROBE: CPT | Mod: QW,,, | Performed by: NURSE PRACTITIONER

## 2025-01-26 RX ORDER — CEVIMELINE HYDROCHLORIDE 30 MG/1
1 CAPSULE ORAL 3 TIMES DAILY
COMMUNITY

## 2025-01-26 RX ORDER — AMOXICILLIN 875 MG/1
875 TABLET, FILM COATED ORAL EVERY 12 HOURS
Qty: 20 TABLET | Refills: 0 | Status: SHIPPED | OUTPATIENT
Start: 2025-01-26 | End: 2025-02-05

## 2025-01-26 RX ORDER — PREDNISONE 20 MG/1
20 TABLET ORAL DAILY
Qty: 5 TABLET | Refills: 0 | Status: SHIPPED | OUTPATIENT
Start: 2025-01-26 | End: 2025-01-31

## 2025-01-26 NOTE — PROGRESS NOTES
"Subjective:      Patient ID: Rhiannon Rangel is a 59 y.o. female.    Vitals:  height is 5' 5" (1.651 m) and weight is 99.8 kg (220 lb). Her oral temperature is 98.1 °F (36.7 °C). Her blood pressure is 130/89 and her pulse is 77. Her respiration is 17 and oxygen saturation is 97%.     Chief Complaint: Sinus Problem    Patient experiencing nasal congestion sinus pain headache sore throat with swallowing hoarseness mild cough and overall not feeling well that started 4-5 days ago but has acutely worsened over the past 2 days with no improvement with OTC medication.    Sinus Problem  This is a new problem. The current episode started in the past 7 days (3 days). The problem has been gradually worsening since onset. There has been no fever. Associated symptoms include congestion, sinus pressure and a sore throat. Pertinent negatives include no chills, coughing, diaphoresis, ear pain, headaches, hoarse voice, neck pain, shortness of breath, sneezing or swollen glands. Past treatments include nothing.       Constitution: Negative for chills and sweating.   HENT:  Positive for congestion, sinus pain, sinus pressure, sore throat and voice change. Negative for ear pain.    Neck: Negative for neck pain.   Respiratory:  Negative for cough and shortness of breath.    Allergic/Immunologic: Negative for sneezing.   Neurological:  Negative for headaches.      Objective:     Physical Exam   Constitutional: She is oriented to person, place, and time. She appears well-developed. She is cooperative.  Non-toxic appearance. She does not appear ill. No distress.   HENT:   Head: Normocephalic and atraumatic.   Ears:   Right Ear: Hearing, tympanic membrane, external ear and ear canal normal.   Left Ear: Hearing, tympanic membrane, external ear and ear canal normal.   Nose: No mucosal edema, rhinorrhea or nasal deformity. No epistaxis. Right sinus exhibits maxillary sinus tenderness. Right sinus exhibits no frontal sinus tenderness. Left " sinus exhibits maxillary sinus tenderness. Left sinus exhibits no frontal sinus tenderness.   Mouth/Throat: Uvula is midline and mucous membranes are normal. No trismus in the jaw. Normal dentition. No uvula swelling. Posterior oropharyngeal erythema (Moderate with injection) present. No oropharyngeal exudate or posterior oropharyngeal edema.   Eyes: Conjunctivae and lids are normal. No scleral icterus.   Neck: Trachea normal and phonation normal. Neck supple. No edema present. No erythema present. No neck rigidity present.   Cardiovascular: Normal rate, regular rhythm, normal heart sounds and normal pulses.   Pulmonary/Chest: Effort normal and breath sounds normal. No respiratory distress. She has no decreased breath sounds. She has no rhonchi.   Abdominal: Normal appearance.   Musculoskeletal: Normal range of motion.         General: No deformity. Normal range of motion.   Neurological: She is alert and oriented to person, place, and time. She exhibits normal muscle tone. Coordination normal.   Skin: Skin is warm, dry, intact, not diaphoretic and not pale.   Psychiatric: Her speech is normal and behavior is normal. Judgment and thought content normal.   Nursing note and vitals reviewed.    Results for orders placed or performed in visit on 01/26/25   POCT Influenza A/B MOLECULAR    Collection Time: 01/26/25 10:11 AM   Result Value Ref Range    POC Molecular Influenza A Ag Negative Negative    POC Molecular Influenza B Ag Negative Negative     Acceptable Yes       Assessment:     1. Acute bacterial pharyngitis    2. Flu-like symptoms        Plan:       Acute bacterial pharyngitis  -     predniSONE (DELTASONE) 20 MG tablet; Take 1 tablet (20 mg total) by mouth once daily. for 5 days  Dispense: 5 tablet; Refill: 0  -     amoxicillin (AMOXIL) 875 MG tablet; Take 1 tablet (875 mg total) by mouth every 12 (twelve) hours. for 10 days  Dispense: 20 tablet; Refill: 0    Flu-like symptoms  -     POCT  Influenza A/B MOLECULAR        Patient Instructions   Please return here or go to the Emergency Department for any concerns or worsening of condition.  Please drink plenty of fluids.  Please get plenty of rest.  If you were prescribed antibiotics, please take them to completion.  If you do not have Hypertension or any history of palpitations, it is ok to take over the counter Sudafed or Mucinex D or Allegra-D or Claritin-D or Zyrtec-D.  If you do take one of the above, it is ok to combine that with plain over the counter Mucinex or Allegra or Claritin or Zyrtec.  If for example you are taking Zyrtec -D, you can combine that with Mucinex, but not Mucinex-D.  If you are taking Mucinex-D, you can combine that with plain Allegra or Claritin or Zyrtec.   If you do have Hypertension or palpitations, it is safe to take Coricidin HBP for relief of sinus symptoms.  If not allergic, please take over the counter Tylenol (Acetaminophen) and/or Motrin (Ibuprofen) as directed for control of pain and/or fever.  Please follow up with your primary care doctor or specialist as needed.    If you  smoke, please stop smoking.        KANDI Verdugo

## 2025-06-20 ENCOUNTER — HOSPITAL ENCOUNTER (OUTPATIENT)
Dept: RADIOLOGY | Facility: HOSPITAL | Age: 60
Discharge: HOME | End: 2025-06-20
Attending: PHYSICAL MEDICINE & REHABILITATION
Payer: COMMERCIAL

## 2025-06-20 ENCOUNTER — RESULTS FOLLOW-UP (OUTPATIENT)
Dept: SPINE | Facility: CLINIC | Age: 60
End: 2025-06-20

## 2025-06-20 ENCOUNTER — OFFICE VISIT (OUTPATIENT)
Dept: SPINE | Facility: CLINIC | Age: 60
End: 2025-06-20
Payer: COMMERCIAL

## 2025-06-20 DIAGNOSIS — M54.2 CERVICALGIA: ICD-10-CM

## 2025-06-20 DIAGNOSIS — M54.2 CERVICALGIA: Primary | ICD-10-CM

## 2025-06-20 PROCEDURE — 72050 X-RAY EXAM NECK SPINE 4/5VWS: CPT | Mod: TC

## 2025-06-20 PROCEDURE — 99999 PR PBB SHADOW E&M-EST. PATIENT-LVL III: CPT | Mod: PBBFAC,,, | Performed by: PHYSICAL MEDICINE & REHABILITATION

## 2025-06-20 PROCEDURE — 72050 X-RAY EXAM NECK SPINE 4/5VWS: CPT | Mod: 26,,, | Performed by: RADIOLOGY

## 2025-06-20 NOTE — PROGRESS NOTES
SUBJECTIVE:    Patient ID: Rhiannon Rangel is a 59 y.o. female.    Chief Complaint: No chief complaint on file.    This is a 59-year-old woman who sees Dr. Lock for her primary care.  History of hypertension and Sjogren's syndrome otherwise denies any chronic major medical problems.  No cancer history.  Presents with a 1 year history of posterior neck discomfort that can radiate anywhere from the occiput to the scapulae.  No radicular symptoms.  No difficulty writing or walking.  No bowel or bladder dysfunction fever chills sweats or unexpected weight loss.  She feels fine in the morning but pain gets worse as the day goes on.  She is a .  She went to 1 chiropractic treatment and did some home exercises provided by primary care.  She did that for a couple of months but did not improve to her satisfaction.  Her current pain level is 2/10 and interferes with her quality of life in terms of activities of daily living recreation and social activities.  I have no imaging to review          Past Medical History:   Diagnosis Date    Hernia, hiatal     Hypertension     Sjogren's disease      Social History[1]  Past Surgical History:   Procedure Laterality Date    KNEE ARTHROSCOPY W/ MENISCAL REPAIR Left 2020    LAPAROSCOPIC CHOLECYSTECTOMY N/A 11/30/2020    Procedure: CHOLECYSTECTOMY, LAPAROSCOPIC;  Surgeon: Shagufta Osuna MD;  Location: [facility];  Service: General;  Laterality: N/A;    SHOULDER SURGERY Left      Family History   Problem Relation Name Age of Onset    Hypertension Mother      Hyperlipidemia Mother      Cancer Father          Lung CA    Hypertension Father      Hyperlipidemia Father      Diabetes Maternal Aunt      Obesity Paternal Aunt       There were no vitals filed for this visit.    Review of Systems   Constitutional:  Negative for chills, diaphoresis, fatigue, fever and unexpected weight change.   HENT:  Negative for trouble swallowing.    Eyes:  Negative for visual disturbance.    Respiratory:  Negative for shortness of breath.    Cardiovascular:  Negative for chest pain.   Gastrointestinal:  Negative for abdominal pain, constipation, nausea and vomiting.   Genitourinary:  Negative for difficulty urinating.   Musculoskeletal:  Negative for arthralgias, back pain, gait problem, joint swelling, myalgias, neck pain and neck stiffness.   Neurological:  Negative for dizziness, speech difficulty, weakness, light-headedness, numbness and headaches.          Objective:      Physical Exam  Neurological:      Mental Status: She is alert and oriented to person, place, and time.      Comments: She is awake and in no acute distress  No point tenderness or palpable masses about the cervical spine  Cervical range of motion is within normal limits albeit with some discomfort at the endpoints of her range particularly in rotation to the right and left  Reflexes- +1-+2 reflexes at the following:   C5-Biceps   C6-Brachioradialis   C7-Triceps   L3/4-Patellar   S1-Achilles   Iron sign is negative bilaterally  Strength testing- 5/5 strength in the following muscle groups:  C5-Elbow flexion  C6-Wrist extension  C7-Elbow extension  C8-Finger flexion  T1-Finger abduction  L2-Hip flexion  L3-Knee extension  L4-Ankle dorsiflexion  L5-Great toe extension  S1-Ankle plantar flexion                    Assessment:       1. Cervicalgia           Plan:     She has a nonfocal neurological examination and no historical red flags.  I suspect she has neck pain on basis of cervical degenerative disc disease.  I think she can be treated conservatively.  I am recommending formal physical therapy.  We will get some x-rays which we can report to her over the phone.  She can follow up with me at the completion of therapy or as needed      Cervicalgia  -     Ambulatory Referral/Consult to Physical Therapy  -     X-Ray Cervical Spine AP Lat with Flex Ex; Future; Expected date: 06/20/2025               [1]   Social  History  Socioeconomic History    Marital status:    Tobacco Use    Smoking status: Never    Smokeless tobacco: Never   Substance and Sexual Activity    Alcohol use: No    Drug use: No

## 2025-06-26 ENCOUNTER — CLINICAL SUPPORT (OUTPATIENT)
Dept: REHABILITATION | Facility: HOSPITAL | Age: 60
End: 2025-06-26
Payer: COMMERCIAL

## 2025-06-26 DIAGNOSIS — R29.898 DECREASED ROM OF NECK: ICD-10-CM

## 2025-06-26 DIAGNOSIS — M54.2 CERVICALGIA: ICD-10-CM

## 2025-06-26 DIAGNOSIS — R68.89 ACTIVITY INTOLERANCE: Primary | ICD-10-CM

## 2025-06-26 PROCEDURE — 97161 PT EVAL LOW COMPLEX 20 MIN: CPT | Mod: PN

## 2025-06-26 PROCEDURE — 97140 MANUAL THERAPY 1/> REGIONS: CPT | Mod: PN

## 2025-06-27 NOTE — PROGRESS NOTES
"  Outpatient Rehab    Physical Therapy Evaluation    Patient Name: Rhiannon Rangel  MRN: [id number]  YOB: 1965  Encounter Date: 6/26/2025    Therapy Diagnosis:   Encounter Diagnoses   Name Primary?    Activity intolerance Yes    Decreased ROM of neck     Cervicalgia      Physician: Marcos Garcia MD    Physician Orders: Eval and Treat  Medical Diagnosis: Cervicalgia  Surgical Diagnosis: Not applicable for this Episode   Surgical Date: Not applicable for this Episode  Days Since Last Surgery: Not applicable for this Episode    Visit # / Visits Authorized:  1 / 1  Insurance Authorization Period: 6/20/2025 to 12/31/2025  Date of Evaluation: 6/26/2025  Plan of Care Certification: 6/26/2025 to 8/15/2025     Time In: 1112   Time Out: 1155  Total Time (in minutes): 43   Total Billable Time (in minutes): 40    Intake Outcome Measure for FOTO Survey    Therapist reviewed FOTO scores for Rhiannon Rangel on 6/26/2025.   FOTO report - see Media section or FOTO account episode details.     Intake Score: 58%    Precautions:     Standard      Subjective   History of Present Illness  Rhiannon is a 59 y.o. female who reports to physical therapy with a chief concern of "My neck hurts".     The patient reports a medical diagnosis of Cervicalgia. The patient has experienced this issue since 06/20/25.   Diagnostic tests related to this condition: X-ray.   X-Ray Details: Cervical Spine 6/20/2025: FINDINGS per report:  "Vertebral body heights are preserved.  No fractures or bony destructive changes.  Alignment is within normal limits without evidence of any dynamic instability.  No major degenerative disc height loss.  No abnormal prevertebral soft tissue thickening."    Dominant Hand: Right  History of Present Condition/Illness: Patient presents to PT today reporting ~ 1 year history of insidious onset neck pain. Symptoms have worsened somewhat since onset. She states she also gets headaches but not sure if from " "neck or something else. She tried chiropractic care x 1 without significant change. She has also been doing some stretches given to her by PCP which have not helped. Went to back and spine clinic recently. X-rays were unremarkable. No medications have been prescribed but does utilize OTC medications when needed. She is now being referred for PT evaluation/treatment.    Activities of Daily Living  Social history was obtained from Patient.       General Current Level of Function Comments: Increased pain with reading, mopping, turning head side to side (driving), looking up (in rearview mirror on bus). Sleep is impaired: has difficulty going to sleep and is awakened by pain. Occurs 2-3 nights per week and when it awakens her it occurs 1-2x per night.  Patient Roles: Caregiver for child  Patient Responsibilities: Community mobility, Driving, Financial management, Health management, Home management, Laundry, Meal prep, Personal ADL, Shopping, Yard work, Other (Comment)  Other Patient Roles and Responsibilities: Gardening, babysits grandchildren    Previously independent with activities of daily living? Yes     Currently independent with activities of daily living? Yes          Previously independent with instrumental activities of daily living? Yes     Currently independent with instrumental activities of daily living? Yes            Pain     Patient reports a current pain level of 1/10. Pain at best is reported as 0/10. Pain at worst is reported as 3/10.   Location: B neck starting "at my hump" and extends into suboccipital region  Clinical Progression (since onset): Worsening  Pain Qualities: Other (Comment), Burning  Other Pain Qualities: Internittent  Pain-Relieving Factors: Rest, Change in position, Activity modification, Medications - over-the-counter  Pain-Aggravating Factors: Bending, Head movements, Driving, Other (Comment), Lifting  Other Pain-Aggravating Factors: Sweeping, mopping, vacuuming, gardening, " reading/use of phone > 10 minutes         Review of Systems  Patient reports: Headache, Sleep Disturbance, and Gallbladder History  Patient denies: Bladder Incontinence, Bowel Incontinence, Chest Pain, Dizziness, Fainting, Fever, Lower Extremity Neurological Deficits, Motion Sickness, Nausea, Night Sweats/Chills, Night Pain, Saddle Numbness, Shortness of Breath, Tinnitus, Weight Gain, Weight Loss, Cancer History, Cardiac History, Diabetes, Immunosuppression History, Kidney History, Osteoarthritis, Recent Infection History, Rheumatoid Arthritis, Stomach History, Trauma History, and Ulcer History        Treatment History  Treatments  Previously Received Treatments: Yes  Previous Treatments: Chiropractic, Exercise, Medications - over-the-counter, Heat  Currently Receiving Treatments: Yes  Current Treatments: Medications - over-the-counter    Living Arrangements  Living Situation  Living Arrangements: Spouse/significant other  Support Systems: Spouse/significant other    Home Setup  Home Access: Level entry  Number of Levels in Home: One level        Employment  Patient reports: Does the patient's condition impact their ability to work?  Employment Status: Employed part-time   Patient drives school bus. Has difficulty looking over shoulder and with looking up into rearview mirror.     Past Medical History/Physical Systems Review:   Rhiannon Rangel  has a past medical history of Hernia, hiatal, Hypertension, and Sjogren's disease.    Rhiannon Rangel  has a past surgical history that includes Shoulder surgery (Left); Laparoscopic cholecystectomy (N/A, 11/30/2020); and Knee arthroscopy w/ meniscal repair (Left, 2020).    Rhiannon has a current medication list which includes the following prescription(s): alendronate, amlodipine, buspirone, calcium carb/mag oxide/zinc ox, cevimeline, cevimeline, escitalopram oxalate, irbesartan, mounjaro, omeprazole, oracea, restasis, and vitamin d.    Review of patient's allergies  indicates:   Allergen Reactions    Demerol [meperidine] Nausea And Vomiting      Objective   Bracing    None noted        Posture                 Standing: pelvis and shoulders level, adequate lumbar lordosis, flattened mid thoracic kyphosis, increased upper thoracic kyphosis. Sitting: decreased lumbar lordosis, increased thoracic kyphosis, moderate rounded shoulder and forward head posture.     Cervical Thoracic Sensation  Right Cervical/Thoracic Sensation  Intact: Light Touch       Left Cervical/Thoracic Sensation  Intact: Light Touch       No deficits reported.         Right Upper Extremity Reflexes       Biceps, C5-C6: Normal (2+)    Brachioradialis, C6: Normal (2+)    Triceps, C7: Normal (2+)         Left Upper Extremity Reflexes       Biceps, C5-C6: Normal (2+)    Brachioradialis, C6: Normal (2+)    Triceps, C7: Normal (2+)             Spinal Mobility  Hypomobile: Cervical and Thoracic  Cervical Mobility Details: Decreased rotation @ C7. Decreased gapping C3-6  Thoracic Mobility Details: Decreased rotation @ T1-3    Spinal Muscle Palpation  Right Spinal Muscle Palpation  Abnormal: Cervical/Thoracic     Excessive suboccipital muscle tension with moderate tenderness. This extends into cervical paraspinals. Increased muscle tension present upper trapezius and levator scapula, pectoralis muscles.     Left Spinal Muscle Palpation  Abnormal: Cervical/Thoracic     Excessive suboccipital muscle tension with moderate tenderness. This extends into cervical paraspinals. Increased muscle tension present upper trapezius and levator scapula, pectoralis muscles.         Subcranial Range of Motion   Active Restricted? Passive Restricted? Pain   Flexion         Protraction         Retraction           Cervical Range of Motion   Active (deg) Passive (deg) Pain   Flexion 45 50     Extension 55 58     Right Lateral Flexion 30 35     Right Rotation 65 75     Left Lateral Flexion 35 40     Left Rotation 58 65            Shoulder  Range of Motion     Shoulder, Elbow, or Forearm Range of Motion Details: B shoulders grossly WFL. L is minimally restricted due to old labral injury with subsequent repair.         Shoulder Strength - Planes of Motion   Right Strength Right Pain Left Strength Left  Pain   Flexion 4+   4     Extension 4+   4+     ABduction 4+   4     ADduction 4+   4+     Horizontal ABduction           Horizontal ADduction           Internal Rotation 0° 4+   4+     Internal Rotation 90°           External Rotation 0° 4+   4+     External Rotation 90°                              Cervical Screen  Tests  Negative: Right Distraction and Left Distraction  Cervical Range of Motion           Thoracic Range of Motion             Cervical/Thoracic Special Tests            Cervical Tests  Negative: Right Alar Ligament, Left Alar Ligament, Right Cervical Compression, Left Cervical Compression, Right Distraction, and Left Distraction               Transfers Assessment  Sit to Stand Assistance: Independent    Bed Mobility Assessment  Rolling Assistance: Independent  Sidelying to Sit Assistance: Independent  Sit to Sidelying Assistance: Independent  Scooting to Edge of Bed Assistance: Independent  Bridge/Boost to Head of Bed Assistance: Independent          Treatment:  Manual Therapy  MT 1: Initiated suboccipital release, grade II manual cervical traction, PROM to cervical spine, passive stretching to B upper trapezii.    Time Entry(in minutes):  PT Evaluation (Low) Time Entry: 32  Manual Therapy Time Entry: 8    Assessment & Plan   Assessment  Rhiannon presents with a condition of Low complexity.   Presentation of Symptoms: Changing  Will Comorbidities Impact Care: No       Functional Limitations: Activity tolerance, Carrying objects, Completing work/school activities, Disrupted sleep pattern, Driving, Participating in leisure activities, Performing household chores, Range of motion, Sitting tolerance  Impairments: Abnormal muscle firing,  Abnormal muscle tone, Abnormal or restricted range of motion, Activity intolerance, Pain with functional activity  Personal Factors Affecting Prognosis: Pain, Physical limitations    Patient Goal for Therapy (PT): To have less pain  Prognosis: Good  Prognosis Details: Anticipated barriers to treatment: none  Assessment Details:  Rhiannon is a 59 y.o. female referred to outpatient Physical Therapy with a medical diagnosis of cervicalgia. Patient presents with therapeutic diagnoses of activity intolerance, decreased neck ROM.  Additional problems include: impaired posture, increased tenderness/muscle tension, impaired cervicothoracic joint play.  These problems contribute to the following limitations: difficulty driving, limited reading, impaired ability to perform sweeping, mopping and vacuuming, intermittent sleep disturbance, limited gardening.  Rhiannon will benefit from skilled PT services to address these problems in order to resolve pain, to improve ROM of neck, to reduce excess muscle tension/tenderness, to improve posture, to reduce sleep disturbance, to minimize functional deficits and to maximize activity tolerance.      Plan  From a physical therapy perspective, the patient would benefit from: Skilled Rehab Services    Planned therapy interventions include: Therapeutic activities, Therapeutic exercise, Manual therapy, Neuromuscular re-education, and Other (Comment). Dry needling, therapeutic taping  Planned modalities to include: Cryotherapy (cold pack), Electrical stimulation - passive/unattended, and Thermotherapy (hot pack).        Visit Frequency: 2 times Per Week for 6 Weeks.       This plan was discussed with Patient.   Discussion participants: Agreed Upon Plan of Care  Plan details: Rhiannon may be treated by PTA as part of their rehab team.       The patient's spiritual, cultural, and educational needs were considered, and the patient is agreeable to the plan of care and goals.      Education  Education was done with Patient. The patient's learning style includes Listening. The patient Verbalizes understanding.         Educated patient in role of PT and proposed POC.     Goals:   Active       Long Term Goals       Facilitate improved upper quadrant flexibility       Start:  06/26/25    Expected End:  08/15/25            Patient will consistently drive without increased neck pain/headaches       Start:  06/26/25    Expected End:  08/15/25            Patient will read/use phons > 30 minutes without increased neck pain       Start:  06/26/25    Expected End:  08/15/25            Patient will consistently perform housework activities without increased neck pain/headaches       Start:  06/26/25    Expected End:  08/15/25            Patient will consistently perform gardening activities without increased pain       Start:  06/26/25    Expected End:  08/15/25            Patient will consistently sleep through the night without interruption by neck pain       Start:  06/26/25    Expected End:  08/15/25            Improve functional outcomes score to > 70% as indicated by FOTO neck survey       Start:  06/26/25    Expected End:  08/15/25            Patient will be independent in HEP       Start:  06/26/25    Expected End:  08/15/25               Short Term Goals       Decrease frequency of neck pain to occasional       Start:  06/26/25    Expected End:  07/23/25            Facilitate improved joint play in cervicothoracic region       Start:  06/26/25    Expected End:  07/23/25            Facilitate improved posture       Start:  06/26/25    Expected End:  07/23/25            Decrease excess muscle tension to minimal/moderate       Start:  06/26/25    Expected End:  07/23/25            Patient will read > 20 minutes without increased neck pain       Start:  06/26/25    Expected End:  07/23/25                Tracey Higgins, PT

## 2025-06-30 ENCOUNTER — CLINICAL SUPPORT (OUTPATIENT)
Dept: REHABILITATION | Facility: HOSPITAL | Age: 60
End: 2025-06-30
Payer: COMMERCIAL

## 2025-06-30 VITALS — DIASTOLIC BLOOD PRESSURE: 82 MMHG | SYSTOLIC BLOOD PRESSURE: 180 MMHG

## 2025-06-30 DIAGNOSIS — M54.2 CERVICALGIA: ICD-10-CM

## 2025-06-30 DIAGNOSIS — R29.898 DECREASED ROM OF NECK: ICD-10-CM

## 2025-06-30 DIAGNOSIS — R68.89 ACTIVITY INTOLERANCE: Primary | ICD-10-CM

## 2025-06-30 PROCEDURE — 97140 MANUAL THERAPY 1/> REGIONS: CPT | Mod: PN,CQ

## 2025-06-30 PROCEDURE — 97110 THERAPEUTIC EXERCISES: CPT | Mod: PN,CQ

## 2025-06-30 NOTE — PROGRESS NOTES
"  Outpatient Rehab    Physical Therapy Visit    Patient Name: Rhiannon Rangel  MRN: [id number]  YOB: 1965  Encounter Date: 6/30/2025    Therapy Diagnosis:   Encounter Diagnoses   Name Primary?    Activity intolerance Yes    Decreased ROM of neck     Cervicalgia      Physician: Marcos Garcia MD    Physician Orders: Eval and Treat  Medical Diagnosis: Cervicalgia  Surgical Diagnosis: Not applicable for this Episode   Surgical Date: Not applicable for this Episode  Days Since Last Surgery: Not applicable for this Episode    Visit # / Visits Authorized: 1 / 20 (2 total)  Insurance Authorization Period: 6/27/2025 to 12/31/2025  Date of Evaluation: 6/26/2025  Plan of Care Certification: 6/26/2025 to 8/15/2025      PT/PTA: PTA   Number of PTA visits since last PT visit: 1  Time In: 1110   Time Out: 1206  Total Time (in minutes): 56   Total Billable Time (in minutes): 55    FOTO:  Intake Score: 58%  Survey Score 2:  %  Survey Score 3:  %    Precautions:     Standard      Subjective   "It is just stiff. I did wake up with a headache in my forehead. My eye doctor said I may have a spike in blood pressure due to the blood-flow to my eyes." Post previous: "It was the most relief I have had in a long time, but it did come back the next day."- denies headache post previous..  Pain reported as 1/10. Generalized posterior neck    Objective   Vital Signs  BP (!) 180/82   BP Location: Left arm  BP Position: Sitting  BP Cuff Size: Adult               Treatment:  Therapeutic Exercise  TE 1: UBE lvl1.5mph x6' (reversing midway)  TE 2: Sitting: Upper trapezius stretches 3x30s ea, Levator scapulae stretches 3x30s ea, Scalene stretches 3x30s ea, SCM stretches 3x30s ea, Middle trapezius/Rhomboid stretches 3x30s ea; Cervical AROM 10x3s ea: Flexion/Extension, Sidebend, Rotation, Digaonals; Chin tucks x10  Manual Therapy  MT 1: Strumming & Moderate pressure to B UT, LS, SCM, Scalene  MT 2: Suboccipital release  MT 3: Manual " cervical traction  MT 4: Passive cervical ROM w/end range stretching      Time Entry(in minutes):  Manual Therapy Time Entry: 10  Therapeutic Exercise Time Entry: 45    Assessment & Plan   Assessment: The patient reported to physical therapy stating slight pain; however, she reports awakening with a headache and nausea. Relief reported post initial evaluation. Rhiannon Rangel was instructed in initial treatment program targeting increased flexibility/decreased soft tissue restrictions, improved functional mobility/cervical range of motion, and improved posture/postural endurance. Excess tension noted in bilateral scalene and levator scapulae, reducing somewhat following manual techniques.  Evaluation/Treatment Tolerance: Patient tolerated treatment well    The patient will continue to benefit from skilled outpatient physical therapy in order to address the deficits listed in the problem list on the initial evaluation, provide patient and family education, and maximize the patients level of independence in the home and community environments.     The patient's spiritual, cultural, and educational needs were considered, and the patient is agreeable to the plan of care and goals.     Education  Education was done with Patient. The patient's learning style includes Demonstration and Listening. The patient Demonstrates understanding and Verbalizes understanding.         The patient was instructed in initial treatment program; education was provided on reasoning behind. Discussed pertinent anatomy and physiology to current condition. Advised patient to utilize cold thermal modalities and or over the counter medications as allowed by MD as needed for symptom management/possible headache with nausea post session.       Plan: The patient is to be progressed within the established plan of care as tolerated in order to accomplish stated goals. Plan to incorporate resisted postural stability and endurance training as  tolerated. Provide formal home exercise program subsequently.    Goals:   Active       Long Term Goals       Facilitate improved upper quadrant flexibility (Progressing)       Start:  06/26/25    Expected End:  08/15/25            Patient will consistently drive without increased neck pain/headaches (Ongoing)       Start:  06/26/25    Expected End:  08/15/25            Patient will read/use phons > 30 minutes without increased neck pain (Ongoing)       Start:  06/26/25    Expected End:  08/15/25            Patient will consistently perform housework activities without increased neck pain/headaches (Ongoing)       Start:  06/26/25    Expected End:  08/15/25            Patient will consistently perform gardening activities without increased pain (Ongoing)       Start:  06/26/25    Expected End:  08/15/25            Patient will consistently sleep through the night without interruption by neck pain (Progressing)       Start:  06/26/25    Expected End:  08/15/25            Improve functional outcomes score to > 70% as indicated by FOTO neck survey (Ongoing)       Start:  06/26/25    Expected End:  08/15/25            Patient will be independent in HEP (Ongoing)       Start:  06/26/25    Expected End:  08/15/25               Short Term Goals       Decrease frequency of neck pain to occasional (Progressing)       Start:  06/26/25    Expected End:  07/23/25            Facilitate improved joint play in cervicothoracic region (Progressing)       Start:  06/26/25    Expected End:  07/23/25            Facilitate improved posture (Progressing)       Start:  06/26/25    Expected End:  07/23/25            Decrease excess muscle tension to minimal/moderate (Progressing)       Start:  06/26/25    Expected End:  07/23/25            Patient will read > 20 minutes without increased neck pain (Ongoing)       Start:  06/26/25    Expected End:  07/23/25                Yanet Perry, MOISES

## 2025-07-08 ENCOUNTER — CLINICAL SUPPORT (OUTPATIENT)
Dept: REHABILITATION | Facility: HOSPITAL | Age: 60
End: 2025-07-08
Attending: PHYSICAL MEDICINE & REHABILITATION
Payer: COMMERCIAL

## 2025-07-08 VITALS — DIASTOLIC BLOOD PRESSURE: 91 MMHG | SYSTOLIC BLOOD PRESSURE: 138 MMHG

## 2025-07-08 DIAGNOSIS — R29.898 DECREASED ROM OF NECK: ICD-10-CM

## 2025-07-08 DIAGNOSIS — R68.89 ACTIVITY INTOLERANCE: Primary | ICD-10-CM

## 2025-07-08 DIAGNOSIS — M54.2 CERVICALGIA: ICD-10-CM

## 2025-07-08 PROCEDURE — 97110 THERAPEUTIC EXERCISES: CPT | Mod: PN,CQ

## 2025-07-08 PROCEDURE — 97140 MANUAL THERAPY 1/> REGIONS: CPT | Mod: PN,CQ

## 2025-07-08 NOTE — PROGRESS NOTES
"  Outpatient Rehab    Physical Therapy Visit    Patient Name: Rhiannon Rangel  MRN: [id number]  YOB: 1965  Encounter Date: 7/8/2025    Therapy Diagnosis:   Encounter Diagnoses   Name Primary?    Activity intolerance Yes    Decreased ROM of neck     Cervicalgia      Physician: Marcos Garcia MD    Physician Orders: Eval and Treat  Medical Diagnosis: Cervicalgia  Surgical Diagnosis: Not applicable for this Episode   Surgical Date: Not applicable for this Episode  Days Since Last Surgery: Not applicable for this Episode    Visit # / Visits Authorized: 2 / 20 (3 total)  Insurance Authorization Period: 6/27/2025 to 12/31/2025  Date of Evaluation: 6/26/2025  Plan of Care Certification: 6/26/2025 to 8/15/2025      PT/PTA: PTA   Number of PTA visits since last PT visit: 2  Time In: 1008   Time Out: 1108  Total Time (in minutes): 60   Total Billable Time (in minutes): 30 due to being doubled    FOTO:  Intake Score: 58%  Survey Score 2:  %  Survey Score 3:  %    Precautions:     Standard      Subjective   "I was sore right afterwards, but I was good all weekend with less pain. I have a headache today, and I am dizzy. I go next week to the doctor, but can you take my blood-pressure again.".  Pain reported as 4/10. Generalized posterior neck & R sided headache    Objective   Vital Signs  BP (!) 138/91   BP Location: Left arm  BP Position: Sitting  BP Cuff Size: Adult               Treatment:  Therapeutic Exercise  TE 1: UBE lvl1.5mph x6' (reversing midway)  TE 2: Sitting: Upper trapezius stretches 3x30s ea, Levator scapulae stretches 3x30s ea, Scalene stretches 3x30s ea, SCM stretches 3x30s ea, Middle trapezius/Rhomboid stretches 3x30s ea; Cervical AROM 10x3s ea: Flexion/Extension, Sidebend, Rotation, Digaonals; Chin tucks x15  Manual Therapy  MT 1: Strumming & Myofascial release to B UT & LS; Myofascial release to R SCM, Scalene, & deep neck flexors  MT 2: Suboccipital release  MT 3: Manual cervical " 5 "traction  MT 4: Passive cervical ROM w/end range stretching      Time Entry(in minutes):  Manual Therapy Time Entry: 10  Therapeutic Exercise Time Entry: 46    Assessment & Plan   Assessment: The patient reported to physical therapy stating moderate neck pain with right sided headache. Rhiannon Rangel was progressed slightly with increased stability training. Due to headache and dizziness, standing postural stability training was not incorporated on this date. Patient is fearful headaches, dizziness, and nausea symptoms are related to blood-pressure, and is to follow up with MD "next week" per report. Blood-pressure elevated on this date, more so diastolic. Excess tension more so noted in right quadrant and bilateral upper trapezii. Patient reports she experiences intermittent ear pain; reviewed ear pulls as needed.  Evaluation/Treatment Tolerance: Patient tolerated treatment well    The patient will continue to benefit from skilled outpatient physical therapy in order to address the deficits listed in the problem list on the initial evaluation, provide patient and family education, and maximize the patients level of independence in the home and community environments.     The patient's spiritual, cultural, and educational needs were considered, and the patient is agreeable to the plan of care and goals.     Education  Education was done with Patient. The patient's learning style includes Demonstration and Listening. The patient Demonstrates understanding and Verbalizes understanding.         The patient was instructed in slight progressions as noted with minimal to moderate cues for technique. Reviewed ear pulls as needed.       Plan: The patient is to be progressed within the established plan of care as tolerated in order to accomplish stated goals. Plan to incorporate resisted postural stability and endurance training as tolerated. Provide formal home exercise program subsequently.    Goals:   Active       Long " Term Goals       Facilitate improved upper quadrant flexibility (Progressing)       Start:  06/26/25    Expected End:  08/15/25            Patient will consistently drive without increased neck pain/headaches (Ongoing)       Start:  06/26/25    Expected End:  08/15/25            Patient will read/use phons > 30 minutes without increased neck pain (Ongoing)       Start:  06/26/25    Expected End:  08/15/25            Patient will consistently perform housework activities without increased neck pain/headaches (Ongoing)       Start:  06/26/25    Expected End:  08/15/25            Patient will consistently perform gardening activities without increased pain (Ongoing)       Start:  06/26/25    Expected End:  08/15/25            Patient will consistently sleep through the night without interruption by neck pain (Progressing)       Start:  06/26/25    Expected End:  08/15/25            Improve functional outcomes score to > 70% as indicated by FOTO neck survey (Ongoing)       Start:  06/26/25    Expected End:  08/15/25            Patient will be independent in HEP (Ongoing)       Start:  06/26/25    Expected End:  08/15/25               Short Term Goals       Decrease frequency of neck pain to occasional (Progressing)       Start:  06/26/25    Expected End:  07/23/25            Facilitate improved joint play in cervicothoracic region (Progressing)       Start:  06/26/25    Expected End:  07/23/25            Facilitate improved posture (Progressing)       Start:  06/26/25    Expected End:  07/23/25            Decrease excess muscle tension to minimal/moderate (Progressing)       Start:  06/26/25    Expected End:  07/23/25            Patient will read > 20 minutes without increased neck pain (Ongoing)       Start:  06/26/25    Expected End:  07/23/25                Yanet Perry, MOISES

## 2025-07-11 ENCOUNTER — CLINICAL SUPPORT (OUTPATIENT)
Dept: REHABILITATION | Facility: HOSPITAL | Age: 60
End: 2025-07-11
Payer: COMMERCIAL

## 2025-07-11 VITALS — HEART RATE: 81 BPM | SYSTOLIC BLOOD PRESSURE: 153 MMHG | DIASTOLIC BLOOD PRESSURE: 89 MMHG

## 2025-07-11 DIAGNOSIS — R29.898 DECREASED ROM OF NECK: ICD-10-CM

## 2025-07-11 DIAGNOSIS — R68.89 ACTIVITY INTOLERANCE: Primary | ICD-10-CM

## 2025-07-11 DIAGNOSIS — M54.2 CERVICALGIA: ICD-10-CM

## 2025-07-11 PROCEDURE — 97140 MANUAL THERAPY 1/> REGIONS: CPT | Mod: PN

## 2025-07-11 PROCEDURE — 97110 THERAPEUTIC EXERCISES: CPT | Mod: PN

## 2025-07-13 NOTE — PROGRESS NOTES
"  Outpatient Rehab    Physical Therapy Visit    Patient Name: Rhiannon Rangel  MRN: [id number]  YOB: 1965  Encounter Date: 7/11/2025    Therapy Diagnosis:   Encounter Diagnoses   Name Primary?    Activity intolerance Yes    Decreased ROM of neck     Cervicalgia      Physician: Marcos Garcia MD    Physician Orders: Eval and Treat  Medical Diagnosis: Cervicalgia  Surgical Diagnosis: Not applicable for this Episode   Surgical Date: Not applicable for this Episode  Days Since Last Surgery: Not applicable for this Episode    Visit # / Visits Authorized:  3 / 20  Insurance Authorization Period: 6/27/2025 to 12/31/2025  Date of Evaluation: 6/26/2025  Plan of Care Certification: 6/26/2025 to 8/15/2025      PT/PTA: PT   Number of PTA visits since last PT visit:   Time In: 1108   Time Out: 1200  Total Time (in minutes): 52   Total Billable Time (in minutes): 48    FOTO:  Intake Score: 58%  Survey Score 2:  %  Survey Score 3:  %    Precautions:     Standard      Subjective   "I felt good after the last time".  Pain reported as 2/10. "In my neck"    Objective   Vital Signs  BP (!) 153/89   Pulse 81   BP Location: Left arm  BP Position: Sitting  BP Cuff Size: Adult       Treatment:  Therapeutic Exercise  TE 1: UBE lvl2.0 mph x6' (reversing midway)  TE 2: Sitting: Upper trapezius stretches 3x30s ea, Levator scapulae stretches 3x30s ea, Scalene stretches 3x30s ea, SCM stretches 3x30s ea, Middle trapezius/Rhomboid stretches 3x30s ea; Cervical AROM 10x3s ea: Flexion/Extension, Sidebend, Rotation, Diagonals; Chin tucks x10  Manual Therapy  MT 2: Suboccipital release  MT 3: Manual cervical traction  MT 4: Passive cervical ROM w/end range stretching    Time Entry(in minutes):  Manual Therapy Time Entry: 10  Therapeutic Exercise Time Entry: 38    Assessment & Plan   Assessment: Rhiannon presented to PT today reporting improvement in symptoms since beginning therapy. However, she does acknowledge increased tenderness " of deep neck flexors during manual therapy last visit. She exhibits improving neck ROM and minimal improvement in posture. Will benefit from postural stabilization exercises and continued postural education.     The patient will continue to benefit from skilled outpatient physical therapy in order to address the deficits listed in the problem list on the initial evaluation, provide patient and family education, and maximize the patients level of independence in the home and community environments.     The patient's spiritual, cultural, and educational needs were considered, and the patient is agreeable to the plan of care and goals.     Education  Education was done with Patient. The patient's learning style includes Demonstration, Listening, and Tactile. The patient Verbalizes understanding and Requires continuing/additional education.         Reviewed exercises as noted with verbal and tactile cues for technique. Illustrated instructions for HEP issued this date. Instructed patient in postural correction.      Plan: The patient is to be progressed within the established plan of care as tolerated in order to accomplish stated goals. Plan to instruct patient in doorway pectoralis stretch, theraband resisted postural stabilization exercises.    Goals:   Active       Long Term Goals       Facilitate improved upper quadrant flexibility (Progressing)       Start:  06/26/25    Expected End:  08/15/25            Patient will consistently drive without increased neck pain/headaches (Progressing)       Start:  06/26/25    Expected End:  08/15/25            Patient will read/use phons > 30 minutes without increased neck pain (Progressing)       Start:  06/26/25    Expected End:  08/15/25            Patient will consistently perform housework activities without increased neck pain/headaches (Progressing)       Start:  06/26/25    Expected End:  08/15/25            Patient will consistently perform gardening activities  without increased pain (Ongoing)       Start:  06/26/25    Expected End:  08/15/25            Patient will consistently sleep through the night without interruption by neck pain (Progressing)       Start:  06/26/25    Expected End:  08/15/25            Improve functional outcomes score to > 70% as indicated by FOTO neck survey (Ongoing)       Start:  06/26/25    Expected End:  08/15/25            Patient will be independent in HEP (Progressing)       Start:  06/26/25    Expected End:  08/15/25               Short Term Goals       Decrease frequency of neck pain to occasional (Progressing)       Start:  06/26/25    Expected End:  07/23/25            Facilitate improved joint play in cervicothoracic region (Progressing)       Start:  06/26/25    Expected End:  07/23/25            Facilitate improved posture (Progressing)       Start:  06/26/25    Expected End:  07/23/25            Decrease excess muscle tension to minimal/moderate (Progressing)       Start:  06/26/25    Expected End:  07/23/25            Patient will read > 20 minutes without increased neck pain (Progressing)       Start:  06/26/25    Expected End:  07/23/25                Tracey Higgins, PT

## 2025-07-14 ENCOUNTER — CLINICAL SUPPORT (OUTPATIENT)
Dept: REHABILITATION | Facility: HOSPITAL | Age: 60
End: 2025-07-14
Attending: PHYSICAL MEDICINE & REHABILITATION
Payer: COMMERCIAL

## 2025-07-14 DIAGNOSIS — R68.89 ACTIVITY INTOLERANCE: Primary | ICD-10-CM

## 2025-07-14 DIAGNOSIS — M54.2 CERVICALGIA: ICD-10-CM

## 2025-07-14 DIAGNOSIS — R29.898 DECREASED ROM OF NECK: ICD-10-CM

## 2025-07-14 PROCEDURE — 97110 THERAPEUTIC EXERCISES: CPT | Mod: PN,CQ

## 2025-07-14 PROCEDURE — 97140 MANUAL THERAPY 1/> REGIONS: CPT | Mod: PN,CQ

## 2025-07-14 NOTE — PROGRESS NOTES
"  Outpatient Rehab    Physical Therapy Visit    Patient Name: Rhiannon Rangel  MRN: [id number]  YOB: 1965  Encounter Date: 7/14/2025    Therapy Diagnosis:   Encounter Diagnoses   Name Primary?    Activity intolerance Yes    Decreased ROM of neck     Cervicalgia      Physician: Marcos Garcia MD    Physician Orders: Eval and Treat  Medical Diagnosis: Cervicalgia  Surgical Diagnosis: Not applicable for this Episode   Surgical Date: Not applicable for this Episode  Days Since Last Surgery: Not applicable for this Episode    Visit # / Visits Authorized: 4 / 20 (5 total)  Insurance Authorization Period: 6/27/2025 to 12/31/2025  Date of Evaluation: 6/26/2025  Plan of Care Certification: 6/26/2025 to 8/15/2025      PT/PTA: PTA   Number of PTA visits since last PT visit: 1  Time In: 1538   Time Out: 1632  Total Time (in minutes): 54   Total Billable Time (in minutes): 53    FOTO:  Intake Score: 58%  Survey Score 2: 52%  Survey Score 3: 58%    Precautions:  Additional Precautions and Protocol Details: Standard      Subjective   "I mowed this weekend." Post previous: "I was more sore than I had been being, but then I rested. It was better, and then I mowed.".  Pain reported as 2/10. 2-3/10 L UT & LS    Objective            Treatment:  Therapeutic Exercise  TE 1: UBE lvl3 x8' (reversing midway)  TE 2: Sitting: Upper trapezius stretches 3x30s ea, Levator scapulae stretches 3x30s ea, Scalene stretches 3x30s ea, SCM stretches 3x30s ea, Middle trapezius/Rhomboid stretches 3x30s ea; Cervical AROM 10x3s ea: Flexion/Extension, Sidebend, Rotation, Diagonals; Chin tucks x15  TE 3: Standing: Doorway pectoral stretches 3x30s; Stability training utilizing bands/tubing x15 ea: Rows w/scapular retractions (green), B shoulder extension w/scapular retractions (green)  Manual Therapy  MT 1: Strumming & Myofascial release to B UT & LS; Myofascial release to R SCM, Scalene, & deep neck flexors  MT 2: Suboccipital release  MT " 3: Manual cervical traction  MT 4: Passive cervical ROM w/end range stretching      Time Entry(in minutes):  Manual Therapy Time Entry: 10  Therapeutic Exercise Time Entry: 43    Assessment & Plan   Assessment: The patient reported to physical therapy stating left sided neck discomfort. Rhiannon Rangel was progressed with increased flexibility and stability training to reduce stress on spine and improve form. Patient demonstrates gradually reducing excess tension.  Evaluation/Treatment Tolerance: Patient tolerated treatment well    The patient will continue to benefit from skilled outpatient physical therapy in order to address the deficits listed in the problem list on the initial evaluation, provide patient and family education, and maximize the patients level of independence in the home and community environments.     The patient's spiritual, cultural, and educational needs were considered, and the patient is agreeable to the plan of care and goals.     Education  Education was done with Patient. The patient's learning style includes Demonstration and Listening. The patient Demonstrates understanding and Verbalizes understanding.         The patient was instructed in progressions as noted with reduced cues needed for technique.       Plan: The patient is to be progressed within the established plan of care as tolerated in order to accomplish stated goals. Plan to incorporate additoinal stability training as tolerated; update home exercise program as further progressed.    Goals:   Active       Long Term Goals       Facilitate improved upper quadrant flexibility (Progressing)       Start:  06/26/25    Expected End:  08/15/25            Patient will consistently drive without increased neck pain/headaches (Progressing)       Start:  06/26/25    Expected End:  08/15/25            Patient will read/use phons > 30 minutes without increased neck pain (Progressing)       Start:  06/26/25    Expected End:  08/15/25             Patient will consistently perform housework activities without increased neck pain/headaches (Progressing)       Start:  06/26/25    Expected End:  08/15/25            Patient will consistently perform gardening activities without increased pain (Ongoing)       Start:  06/26/25    Expected End:  08/15/25            Patient will consistently sleep through the night without interruption by neck pain (Progressing)       Start:  06/26/25    Expected End:  08/15/25            Improve functional outcomes score to > 70% as indicated by FOTO neck survey (Progressing)       Start:  06/26/25    Expected End:  08/15/25            Patient will be independent in HEP (Progressing)       Start:  06/26/25    Expected End:  08/15/25               Short Term Goals       Decrease frequency of neck pain to occasional (Progressing)       Start:  06/26/25    Expected End:  07/23/25            Facilitate improved joint play in cervicothoracic region (Progressing)       Start:  06/26/25    Expected End:  07/23/25            Facilitate improved posture (Progressing)       Start:  06/26/25    Expected End:  07/23/25            Decrease excess muscle tension to minimal/moderate (Progressing)       Start:  06/26/25    Expected End:  07/23/25            Patient will read > 20 minutes without increased neck pain (Progressing)       Start:  06/26/25    Expected End:  07/23/25                Yanet Perry, PTA

## 2025-07-18 ENCOUNTER — CLINICAL SUPPORT (OUTPATIENT)
Dept: REHABILITATION | Facility: HOSPITAL | Age: 60
End: 2025-07-18
Payer: COMMERCIAL

## 2025-07-18 DIAGNOSIS — R29.898 DECREASED ROM OF NECK: ICD-10-CM

## 2025-07-18 DIAGNOSIS — M54.2 CERVICALGIA: ICD-10-CM

## 2025-07-18 DIAGNOSIS — R68.89 ACTIVITY INTOLERANCE: Primary | ICD-10-CM

## 2025-07-18 PROCEDURE — 97110 THERAPEUTIC EXERCISES: CPT | Mod: PN,CQ

## 2025-07-18 PROCEDURE — 97140 MANUAL THERAPY 1/> REGIONS: CPT | Mod: PN,CQ

## 2025-07-18 NOTE — PROGRESS NOTES
"  Outpatient Rehab    Physical Therapy Visit    Patient Name: Rhiannon Rangel  MRN: [id number]  YOB: 1965  Encounter Date: 7/18/2025    Therapy Diagnosis:   Encounter Diagnoses   Name Primary?    Activity intolerance Yes    Decreased ROM of neck     Cervicalgia      Physician: Marcos Garcia MD    Physician Orders: Eval and Treat  Medical Diagnosis: Cervicalgia  Surgical Diagnosis: Not applicable for this Episode   Surgical Date: Not applicable for this Episode  Days Since Last Surgery: Not applicable for this Episode    Visit # / Visits Authorized: 5 / 20 (6 total)  Insurance Authorization Period: 6/27/2025 to 12/31/2025  Date of Evaluation: 6/26/2025  Plan of Care Certification: 6/26/2025 to 8/15/2025      PT/PTA: PTA   Number of PTA visits since last PT visit: 2  Time In: 1100   Time Out: 1156  Total Time (in minutes): 56   Total Billable Time (in minutes): 55    FOTO:  Intake Score (%): 58  Survey Score 2 (%): 52  Survey Score 3 (%): 58    Precautions:  Additional Precautions and Protocol Details: Standard      Subjective   "It really isn't bad. I haven't been having the headaches like I was. Yes, the medicine is helping the blood-pressure." "I was doing my exercises yesterday while I was waiting for another appointment, so I might be a little sore from that.".  Pain reported as 1/10. 1-2/10 L UT & LS    Objective            Treatment:  Therapeutic Exercise  TE 1: UBE lvl3 x8' (reversing midway)  TE 2: Sitting: Upper trapezius stretches 3x30s ea, Levator scapulae stretches 3x30s ea, Scalene stretches 3x30s ea, SCM stretches 3x30s ea, Middle trapezius/Rhomboid stretches 3x30s ea; Chin tucks x20  TE 3: Standing: Doorway pectoral stretches 3x30s; Stability training utilizing bands/tubing: Rows w/scapular retractions (green) x20, B shoulder extension w/scapular retractions (green) x20, B ER @side w/scapular retractions (green) x20, B horizontal abduction w/scapular retractions (green) x15, Red " theraband walkouts x5 bouts  Manual Therapy  MT 1: Strumming & Myofascial release to B UT & LS; Myofascial release to R SCM, Scalene, & deep neck flexors  MT 2: Suboccipital release  MT 3: Manual cervical traction  MT 4: Passive cervical ROM w/end range stretching      Time Entry(in minutes):  Manual Therapy Time Entry: 10  Therapeutic Exercise Time Entry: 46    Assessment & Plan   Assessment: The patient reported to physical therapy stating reducing symptoms and resolution of headache. Rhiannon Rangel was progressed with increased stability training to aide in improved form and postural endurance. Tension reducing, and range of motion improving.  Evaluation/Treatment Tolerance: Patient tolerated treatment well    The patient will continue to benefit from skilled outpatient physical therapy in order to address the deficits listed in the problem list on the initial evaluation, provide patient and family education, and maximize the patients level of independence in the home and community environments.     The patient's spiritual, cultural, and educational needs were considered, and the patient is agreeable to the plan of care and goals.     Education  Education was done with Patient. The patient's learning style includes Demonstration and Listening. The patient Demonstrates understanding and Verbalizes understanding.         The patient was instructed in progressions as noted.       Plan: The patient is to be progressed within the established plan of care as tolerated in order to accomplish stated goals. Plan to increase stability training as tolerated. Update home exercise program to include stability training.    Goals:   Active       Long Term Goals       Facilitate improved upper quadrant flexibility (Progressing)       Start:  06/26/25    Expected End:  08/15/25            Patient will consistently drive without increased neck pain/headaches (Progressing)       Start:  06/26/25    Expected End:  08/15/25             Patient will read/use phons > 30 minutes without increased neck pain (Progressing)       Start:  06/26/25    Expected End:  08/15/25            Patient will consistently perform housework activities without increased neck pain/headaches (Progressing)       Start:  06/26/25    Expected End:  08/15/25            Patient will consistently perform gardening activities without increased pain (Progressing)       Start:  06/26/25    Expected End:  08/15/25            Patient will consistently sleep through the night without interruption by neck pain (Progressing)       Start:  06/26/25    Expected End:  08/15/25            Improve functional outcomes score to > 70% as indicated by FOTO neck survey (Progressing)       Start:  06/26/25    Expected End:  08/15/25            Patient will be independent in HEP (Progressing)       Start:  06/26/25    Expected End:  08/15/25               Short Term Goals       Decrease frequency of neck pain to occasional (Progressing)       Start:  06/26/25    Expected End:  07/23/25            Facilitate improved joint play in cervicothoracic region (Progressing)       Start:  06/26/25    Expected End:  07/23/25            Facilitate improved posture (Progressing)       Start:  06/26/25    Expected End:  07/23/25            Decrease excess muscle tension to minimal/moderate (Progressing)       Start:  06/26/25    Expected End:  07/23/25            Patient will read > 20 minutes without increased neck pain (Progressing)       Start:  06/26/25    Expected End:  07/23/25                Yanet Perry PTA

## 2025-07-30 ENCOUNTER — CLINICAL SUPPORT (OUTPATIENT)
Dept: REHABILITATION | Facility: HOSPITAL | Age: 60
End: 2025-07-30
Payer: COMMERCIAL

## 2025-07-30 DIAGNOSIS — R29.898 DECREASED ROM OF NECK: ICD-10-CM

## 2025-07-30 DIAGNOSIS — R68.89 ACTIVITY INTOLERANCE: Primary | ICD-10-CM

## 2025-07-30 DIAGNOSIS — M54.2 CERVICALGIA: ICD-10-CM

## 2025-07-30 PROCEDURE — 97140 MANUAL THERAPY 1/> REGIONS: CPT | Mod: PN,CQ

## 2025-07-30 PROCEDURE — 97110 THERAPEUTIC EXERCISES: CPT | Mod: PN,CQ

## 2025-07-30 NOTE — PROGRESS NOTES
"  Outpatient Rehab    Physical Therapy Visit    Patient Name: Rhiannon Rangel  MRN: [id number]  YOB: 1965  Encounter Date: 7/30/2025    Therapy Diagnosis:   Encounter Diagnoses   Name Primary?    Activity intolerance Yes    Decreased ROM of neck     Cervicalgia      Physician: Marcos Garcia MD    Physician Orders: Eval and Treat  Medical Diagnosis: Cervicalgia  Surgical Diagnosis: Not applicable for this Episode   Surgical Date: Not applicable for this Episode  Days Since Last Surgery: Not applicable for this Episode    Visit # / Visits Authorized: 6 / 20 (7 total)  Insurance Authorization Period: 6/27/2025 to 12/31/2025  Date of Evaluation: 6/26/2025  Plan of Care Certification: 6/26/2025 to 8/15/2025      PT/PTA: PTA   Number of PTA visits since last PT visit: 3  Time In: 0900   Time Out: 0953  Total Time (in minutes): 53   Total Billable Time (in minutes): 53    FOTO:  Intake Score (%): 58  Survey Score 2 (%): 52  Survey Score 3 (%): 58    Precautions:  Additional Precautions and Protocol Details: Standard      Subjective   "It really isn't bad." Reports headache over the weekend, subsided without medication. "I haven't had a lot of headaches since starting this.".  Pain reported as 1/10. L UT & LS    Objective            Treatment:  Therapeutic Exercise  TE 1: UBE lvl3 x8' (reversing midway)  TE 2: Sitting: Upper trapezius stretches 3x30s ea, Levator scapulae stretches 3x30s ea, Scalene stretches 3x30s ea, SCM stretches 3x30s ea, Middle trapezius/Rhomboid stretches 3x30s ea; Chin tucks x20  TE 3: Standing: Doorway pectoral stretches 3x30s; Stability training utilizing bands/tubing: Rows w/scapular retractions (green) x20, B shoulder extension w/scapular retractions (green) x20, B ER @side w/scapular retractions (green) x20, B horizontal abduction w/scapular retractions (green) x20, Red theraband walkouts x5 bouts  Manual Therapy  MT 1: Strumming & Myofascial release to B UT, LS, Scalene  MT " 2: Suboccipital release  MT 3: Manual cervical traction  MT 4: Passive cervical ROM w/end range stretching      Time Entry(in minutes):  Manual Therapy Time Entry: 10  Therapeutic Exercise Time Entry: 43    Assessment & Plan   Assessment: The patient reported to physical therapy stating minimal symptoms; however, headache that subsided on its on noted over the weekend, following lawn-care. Rhiannon Rangel was slightly progressed with increased stability training to aide in improved form, postural endurance, and decreased stress on spine. Tension reducing, mostly noted tension in bilateral levator scapulae.  Evaluation/Treatment Tolerance: Patient tolerated treatment well    The patient will continue to benefit from skilled outpatient physical therapy in order to address the deficits listed in the problem list on the initial evaluation, provide patient and family education, and maximize the patients level of independence in the home and community environments.     The patient's spiritual, cultural, and educational needs were considered, and the patient is agreeable to the plan of care and goals.     Education  Education was done with Patient. The patient's learning style includes Demonstration, Listening, and Tactile. The patient Demonstrates understanding and Verbalizes understanding.         The patient was instructed in slight progression as noted. Cues required for form during stability training.       Plan: The patient is to be progressed within the established plan of care as tolerated in order to accomplish stated goals. Plan to update home exercise program to include stability training.    Goals:   Active       Long Term Goals       Facilitate improved upper quadrant flexibility (Progressing)       Start:  06/26/25    Expected End:  08/15/25            Patient will consistently drive without increased neck pain/headaches (Progressing)       Start:  06/26/25    Expected End:  08/15/25            Patient will  read/use phons > 30 minutes without increased neck pain (Progressing)       Start:  06/26/25    Expected End:  08/15/25            Patient will consistently perform housework activities without increased neck pain/headaches (Progressing)       Start:  06/26/25    Expected End:  08/15/25            Patient will consistently perform gardening activities without increased pain (Progressing)       Start:  06/26/25    Expected End:  08/15/25            Patient will consistently sleep through the night without interruption by neck pain (Met)       Start:  06/26/25    Expected End:  08/15/25    Resolved:  07/30/25         Improve functional outcomes score to > 70% as indicated by FOTO neck survey (Progressing)       Start:  06/26/25    Expected End:  08/15/25            Patient will be independent in HEP (Progressing)       Start:  06/26/25    Expected End:  08/15/25               Short Term Goals       Decrease frequency of neck pain to occasional (Progressing)       Start:  06/26/25    Expected End:  07/23/25            Facilitate improved joint play in cervicothoracic region (Progressing)       Start:  06/26/25    Expected End:  07/23/25            Facilitate improved posture (Progressing)       Start:  06/26/25    Expected End:  07/23/25            Decrease excess muscle tension to minimal/moderate (Progressing)       Start:  06/26/25    Expected End:  07/23/25            Patient will read > 20 minutes without increased neck pain (Progressing)       Start:  06/26/25    Expected End:  07/23/25                Yanet Perry, MOISES

## 2025-08-01 ENCOUNTER — CLINICAL SUPPORT (OUTPATIENT)
Dept: REHABILITATION | Facility: HOSPITAL | Age: 60
End: 2025-08-01
Attending: PHYSICAL MEDICINE & REHABILITATION
Payer: COMMERCIAL

## 2025-08-01 DIAGNOSIS — R29.898 DECREASED ROM OF NECK: ICD-10-CM

## 2025-08-01 DIAGNOSIS — M54.2 CERVICALGIA: ICD-10-CM

## 2025-08-01 DIAGNOSIS — R68.89 ACTIVITY INTOLERANCE: Primary | ICD-10-CM

## 2025-08-01 PROCEDURE — 97110 THERAPEUTIC EXERCISES: CPT | Mod: PN,CQ

## 2025-08-01 PROCEDURE — 97140 MANUAL THERAPY 1/> REGIONS: CPT | Mod: PN,CQ

## 2025-08-01 NOTE — PROGRESS NOTES
"  Outpatient Rehab    Physical Therapy Visit    Patient Name: Rihannon Rangel  MRN: [id number]  YOB: 1965  Encounter Date: 8/1/2025    Therapy Diagnosis:   Encounter Diagnoses   Name Primary?    Activity intolerance Yes    Decreased ROM of neck     Cervicalgia      Physician: Marcos Garcia MD    Physician Orders: Eval and Treat  Medical Diagnosis: Cervicalgia  Surgical Diagnosis: Not applicable for this Episode   Surgical Date: Not applicable for this Episode  Days Since Last Surgery: Not applicable for this Episode    Visit # / Visits Authorized: 7 / 20 (8 total)  Insurance Authorization Period: 6/27/2025 to 12/31/2025  Date of Evaluation: 6/26/2025  Plan of Care Certification: 6/26/2025 to 8/15/2025      PT/PTA: PTA   Number of PTA visits since last PT visit: 4  Time In: 0907   Time Out: 0956  Total Time (in minutes): 49   Total Billable Time (in minutes): 48    FOTO:  Intake Score (%): 58  Survey Score 2 (%): 52  Survey Score 3 (%): 58    Precautions:  Additional Precautions and Protocol Details: Standard      Subjective   "It is good." Denies pain and recent headaches. Post previous session: "It was good.".  Pain reported as 0/10. N/A at this time    Objective            Treatment:  Therapeutic Exercise  TE 1: UBE lvl4 x8' (reversing midway)  TE 2: Sitting: Upper trapezius stretches 3x30s ea, Levator scapulae stretches 3x30s ea, Scalene stretches 3x30s ea, SCM stretches 3x30s ea, Middle trapezius/Rhomboid stretches 3x30s ea; Chin tucks x20  TE 3: Standing: Doorway pectoral stretches 3x30s; Stability training utilizing bands/tubing: Rows w/scapular retractions (green) x20, B shoulder extension w/scapular retractions (green) x20, B ER @side w/scapular retractions (green) x20, B horizontal abduction w/scapular retractions (green) x20, Lat pulls (green) x20, Red theraband walkouts x6 bouts  Manual Therapy  MT 1: Strumming & Myofascial release to B UT, LS, Scalene, SCM  MT 2: Suboccipital " release  MT 3: Manual cervical traction  MT 4: Passive cervical ROM w/end range stretching      Time Entry(in minutes):  Manual Therapy Time Entry: 8  Therapeutic Exercise Time Entry: 40    Assessment & Plan   Assessment: The patient reported to physical therapy without present pain; patient denies recent headache. Rhiannon Rangel was progressed slightly with stability training for improved form, spinal support, postural endurance, and in turn reduced stress on spine. Reducing excess tension noted. Patient is nearing discharge.  Evaluation/Treatment Tolerance: Patient tolerated treatment well    The patient will continue to benefit from skilled outpatient physical therapy in order to address the deficits listed in the problem list on the initial evaluation, provide patient and family education, and maximize the patients level of independence in the home and community environments.     The patient's spiritual, cultural, and educational needs were considered, and the patient is agreeable to the plan of care and goals.     Education  Education was done with Patient. The patient's learning style includes Demonstration, Listening, and Tactile. The patient Demonstrates understanding and Verbalizes understanding.         The patient was instructed in slight progression as noted with tactile cues initially for technique. Reviewed that if symptoms remain stable we will prepare for upcoming discharge with home exercise program maintenance.       Plan: The patient is to be progressed within the established plan of care as tolerated in order to accomplish stated goals. Plan to review body mechanics training in preporation for upcoming discharge as symptoms remain stable.    Goals:   Active       Long Term Goals       Facilitate improved upper quadrant flexibility (Progressing)       Start:  06/26/25    Expected End:  08/15/25            Patient will consistently drive without increased neck pain/headaches (Met)       Start:   06/26/25    Expected End:  08/15/25    Resolved:  08/01/25         Patient will read/use phons > 30 minutes without increased neck pain (Progressing)       Start:  06/26/25    Expected End:  08/15/25            Patient will consistently perform housework activities without increased neck pain/headaches (Progressing)       Start:  06/26/25    Expected End:  08/15/25            Patient will consistently perform gardening activities without increased pain (Progressing)       Start:  06/26/25    Expected End:  08/15/25            Patient will consistently sleep through the night without interruption by neck pain (Met)       Start:  06/26/25    Expected End:  08/15/25    Resolved:  07/30/25         Improve functional outcomes score to > 70% as indicated by FOTO neck survey (Progressing)       Start:  06/26/25    Expected End:  08/15/25            Patient will be independent in HEP (Progressing)       Start:  06/26/25    Expected End:  08/15/25               Short Term Goals       Decrease frequency of neck pain to occasional (Met)       Start:  06/26/25    Expected End:  07/23/25    Resolved:  08/01/25         Facilitate improved joint play in cervicothoracic region (Met)       Start:  06/26/25    Expected End:  07/23/25    Resolved:  08/01/25         Facilitate improved posture (Progressing)       Start:  06/26/25    Expected End:  07/23/25            Decrease excess muscle tension to minimal/moderate (Met)       Start:  06/26/25    Expected End:  07/23/25    Resolved:  08/01/25         Patient will read > 20 minutes without increased neck pain (Progressing)       Start:  06/26/25    Expected End:  07/23/25                Yanet Perry PTA

## 2025-08-05 ENCOUNTER — CLINICAL SUPPORT (OUTPATIENT)
Dept: REHABILITATION | Facility: HOSPITAL | Age: 60
End: 2025-08-05
Attending: PHYSICAL MEDICINE & REHABILITATION
Payer: COMMERCIAL

## 2025-08-05 DIAGNOSIS — R68.89 ACTIVITY INTOLERANCE: Primary | ICD-10-CM

## 2025-08-05 DIAGNOSIS — M54.2 CERVICALGIA: ICD-10-CM

## 2025-08-05 DIAGNOSIS — R29.898 DECREASED ROM OF NECK: ICD-10-CM

## 2025-08-05 PROCEDURE — 97140 MANUAL THERAPY 1/> REGIONS: CPT | Mod: PN,CQ

## 2025-08-05 PROCEDURE — 97110 THERAPEUTIC EXERCISES: CPT | Mod: PN,CQ

## 2025-08-05 PROCEDURE — 97530 THERAPEUTIC ACTIVITIES: CPT | Mod: PN,CQ

## 2025-08-05 NOTE — PROGRESS NOTES
"  Outpatient Rehab    Physical Therapy Visit    Patient Name: Rhiannon Rangel  MRN: [id number]  YOB: 1965  Encounter Date: 8/5/2025    Therapy Diagnosis:   Encounter Diagnoses   Name Primary?    Activity intolerance Yes    Decreased ROM of neck     Cervicalgia      Physician: Marcos Garcia MD    Physician Orders: Eval and Treat  Medical Diagnosis: Cervicalgia  Surgical Diagnosis: Not applicable for this Episode   Surgical Date: Not applicable for this Episode  Days Since Last Surgery: Not applicable for this Episode    Visit # / Visits Authorized: 8 / 20 (9 total)  Insurance Authorization Period: 6/27/2025 to 12/31/2025  Date of Evaluation: 6/26/2025  Plan of Care Certification: 6/26/2025 to 8/15/2025      PT/PTA: PTA   Number of PTA visits since last PT visit: 5  Time In: 0903   Time Out: 0952  Total Time (in minutes): 49   Total Billable Time (in minutes): 48    FOTO:  Intake Score (%): 58  Survey Score 2 (%): 52  Survey Score 3 (%): 58    Precautions:  Additional Precautions and Protocol Details: Standard      Subjective   "It's good.".  Pain reported as 0/10. Denies pain    Objective            Treatment:  Therapeutic Exercise  TE 1: UBE lvl4 x8' (reversing midway)  TE 3: Standing: Stability training utilizing bands/tubing: Rows w/scapular retractions (green) x20, B shoulder extension w/scapular retractions (green) x20, B ER @side w/scapular retractions (green) x20, B horizontal abduction w/scapular retractions (green) x20, Lat pulls (green) x20, Red theraband walkouts x10 bouts  Manual Therapy  MT 1: Strumming & Myofascial release to B UT, LS, Scalene, SCM  MT 2: Suboccipital release  MT 3: Manual cervical traction  MT 4: Passive cervical ROM w/end range stretching  Therapeutic Activity  TA 1: Body mechanics training: Squat lift from thigh and floor height utilizing 23# box, as well as mimicing lifting animal feed bag and management, pushing/pulling load, carrying load, overhead lifting, " retrieving laundry, sweeping, vacuuming, mopping    Time Entry(in minutes):  Manual Therapy Time Entry: 10  Therapeutic Activity Time Entry: 18  Therapeutic Exercise Time Entry: 20    Assessment & Plan   Assessment: The patient reported to physical therapy without present complaint. Rhiannon Rangel was progressed with increased stability training. Reviewed body mechanics training to reduce strain and chance of future/further injury in preporation for discharge. Excess tension reducing; minimal to no sensitivity noted.  Evaluation/Treatment Tolerance: Patient tolerated treatment well    The patient will continue to benefit from skilled outpatient physical therapy in order to address the deficits listed in the problem list on the initial evaluation, provide patient and family education, and maximize the patients level of independence in the home and community environments.     The patient's spiritual, cultural, and educational needs were considered, and the patient is agreeable to the plan of care and goals.     Education  Education was done with Patient. The patient's learning style includes Demonstration and Listening. The patient Demonstrates understanding and Verbalizes understanding.         The patient was instructed in progressions as noted. Reviewed body mechanics training.       Plan: The patient is to be progressed within the established plan of care as tolerated in order to accomplish stated goals. Plan to discharge in subsequent session as symptoms remain stable.    Goals:   Active       Long Term Goals       Facilitate improved upper quadrant flexibility (Met)       Start:  06/26/25    Expected End:  08/15/25    Resolved:  08/05/25         Patient will consistently drive without increased neck pain/headaches (Met)       Start:  06/26/25    Expected End:  08/15/25    Resolved:  08/01/25         Patient will read/use phons > 30 minutes without increased neck pain (Progressing)       Start:  06/26/25     Expected End:  08/15/25            Patient will consistently perform housework activities without increased neck pain/headaches (Progressing)       Start:  06/26/25    Expected End:  08/15/25            Patient will consistently perform gardening activities without increased pain (Progressing)       Start:  06/26/25    Expected End:  08/15/25            Patient will consistently sleep through the night without interruption by neck pain (Met)       Start:  06/26/25    Expected End:  08/15/25    Resolved:  07/30/25         Improve functional outcomes score to > 70% as indicated by FOTO neck survey (Progressing)       Start:  06/26/25    Expected End:  08/15/25            Patient will be independent in HEP (Progressing)       Start:  06/26/25    Expected End:  08/15/25               Short Term Goals       Decrease frequency of neck pain to occasional (Met)       Start:  06/26/25    Expected End:  07/23/25    Resolved:  08/01/25         Facilitate improved joint play in cervicothoracic region (Met)       Start:  06/26/25    Expected End:  07/23/25    Resolved:  08/01/25         Facilitate improved posture (Progressing)       Start:  06/26/25    Expected End:  07/23/25            Decrease excess muscle tension to minimal/moderate (Met)       Start:  06/26/25    Expected End:  07/23/25    Resolved:  08/01/25         Patient will read > 20 minutes without increased neck pain (Progressing)       Start:  06/26/25    Expected End:  07/23/25                Yanet Perry PTA

## 2025-08-08 ENCOUNTER — CLINICAL SUPPORT (OUTPATIENT)
Dept: REHABILITATION | Facility: HOSPITAL | Age: 60
End: 2025-08-08
Attending: PHYSICAL MEDICINE & REHABILITATION
Payer: COMMERCIAL

## 2025-08-08 DIAGNOSIS — R68.89 ACTIVITY INTOLERANCE: Primary | ICD-10-CM

## 2025-08-08 DIAGNOSIS — R29.898 DECREASED ROM OF NECK: ICD-10-CM

## 2025-08-08 DIAGNOSIS — M54.2 CERVICALGIA: ICD-10-CM

## 2025-08-08 PROCEDURE — 97530 THERAPEUTIC ACTIVITIES: CPT | Mod: PN

## 2025-08-08 PROCEDURE — 97110 THERAPEUTIC EXERCISES: CPT | Mod: PN

## 2025-08-08 NOTE — PROGRESS NOTES
"  Outpatient Rehab    Physical Therapy Discharge    Patient Name: Rhiannon Rangel  MRN: [id number]  YOB: 1965  Encounter Date: 8/8/2025    Therapy Diagnosis:   Encounter Diagnoses   Name Primary?    Activity intolerance Yes    Decreased ROM of neck     Cervicalgia      Physician: Marcos Garcia MD    Physician Orders: Eval and Treat  Medical Diagnosis: Cervicalgia  Surgical Diagnosis: Not applicable for this Episode   Surgical Date: Not applicable for this Episode  Days Since Last Surgery: Not applicable for this Episode    Visit # / Visits Authorized:  9 / 20  Insurance Authorization Period: 6/27/2025 to 12/31/2025  Date of Evaluation: 6/26/2025  Plan of Care Certification: 6/26/2025 to 8/15/2025      PT/PTA: PT   Number of PTA visits since last PT visit:   Time In: 0905   Time Out: 0938  Total Time (in minutes): 33   Total Billable Time (in minutes): 28    FOTO:  Intake Score (%): 58  Survey Score 2 (%): 58  Survey Score 3 (%): 85    Precautions:  Additional Precautions and Protocol Details: Standard    Subjective   "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  Pain reported as 0/10. No pain this date.    Objective      Subcranial Range of Motion   Active Restricted? Passive Restricted? Pain   Flexion         Protraction         Retraction           Cervical Range of Motion   Active (deg) Passive (deg) Pain   Flexion 55 60     Extension 60 65     Right Lateral Flexion 35 39     Right Rotation 75 80     Left Lateral Flexion 37 43     Left Rotation 70 75                  Treatment:  Therapeutic Exercise  TE 1: UBE lvl4 x8' (reversing midway)  TE 3: Standing: Stability training utilizing bands/tubing: Rows w/scapular retractions (green) x20, B shoulder extension w/scapular retractions (green) x20, B ER @side w/scapular retractions (green) x20, B horizontal abduction w/scapular retractions (green) x20, Lat pulls (green) " x20, Red theraband wall walks x10 bouts  Manual Therapy  MT 1: Grade II lateral to medial glide to both R and L C7-T3, Grade II posterior to anterior glide T2-4  Therapeutic Activity  TA 1: Body mechanics training: Squat lift from thigh and floor height utilizing 23# box, carrying load, overhead lifting. Reviewed technique retrieving laundry from washer and dryer. 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    Time Entry(in minutes):  Manual Therapy Time Entry: 5  Therapeutic Activity Time Entry: 8  Therapeutic Exercise Time Entry: 15    Assessment & Plan   Assessment: Rhiannon has made steady progress in PT with reduced pain, improved ROM, improved upper quadrant flexibility, increased core strength, and decreased tenderness/soft tissue tension. She demonstrates ability to perform HEP using illustrated instructions and to perform correct body mechanics for lifting and carrying. She is ready for discharge.   Evaluation/Treatment Tolerance: Patient tolerated treatment well    The patient's spiritual, cultural, and educational needs were considered, and the patient is agreeable to the plan of care and goals.     Education  Education was done with Patient. The patient's learning style includes Listening and Demonstration. The patient Verbalizes understanding and Demonstrates understanding.         Reviewed HEP and body mechanics for lifting, overhead lifting, carrying, reaching into washer/dryer.      Plan: Discharge to home exercise program.    Goals:   Resolved       Long Term Goals       Facilitate improved upper quadrant flexibility (Met)       Start:  06/26/25    Expected End:  08/15/25    Resolved:  08/05/25         Patient will consistently drive without increased neck pain/headaches (Met)       Start:  06/26/25    Expected End:  08/15/25    Resolved:  08/01/25         Patient will read/use phons > 30 minutes without increased neck pain (Met)       Start:  06/26/25    Expected End:  08/15/25    Resolved:   08/08/25         Patient will consistently perform housework activities without increased neck pain/headaches (Met)       Start:  06/26/25    Expected End:  08/15/25    Resolved:  08/08/25         Patient will consistently perform gardening activities without increased pain (Met)       Start:  06/26/25    Expected End:  08/15/25    Resolved:  08/08/25         Patient will consistently sleep through the night without interruption by neck pain (Met)       Start:  06/26/25    Expected End:  08/15/25    Resolved:  07/30/25         Improve functional outcomes score to > 70% as indicated by FOTO neck survey (Met)       Start:  06/26/25    Expected End:  08/15/25    Resolved:  08/08/25         Patient will be independent in HEP (Met)       Start:  06/26/25    Expected End:  08/15/25    Resolved:  08/08/25            Short Term Goals       Decrease frequency of neck pain to occasional (Met)       Start:  06/26/25    Expected End:  07/23/25    Resolved:  08/01/25         Facilitate improved joint play in cervicothoracic region (Met)       Start:  06/26/25    Expected End:  07/23/25    Resolved:  08/01/25         Facilitate improved posture (Met)       Start:  06/26/25    Expected End:  07/23/25    Resolved:  08/08/25         Decrease excess muscle tension to minimal/moderate (Met)       Start:  06/26/25    Expected End:  07/23/25    Resolved:  08/01/25         Patient will read > 20 minutes without increased neck pain (Met)       Start:  06/26/25    Expected End:  07/23/25    Resolved:  08/08/25             Tracey Higgins, PT

## (undated) DEVICE — TOWEL OR BLUE      MDT2168284

## (undated) DEVICE — SHEARS MINI-SHEARS 5X31 174301

## (undated) DEVICE — Device

## (undated) DEVICE — SPONGE GAUZE 2X2 STERILE 2/PK 441204

## (undated) DEVICE — TROCAR ENDOPATH XCEL BLADELESS B5LT

## (undated) DEVICE — ADHESIVE TISSUE EXOFIN

## (undated) DEVICE — CABLE MONOPOLAR 10FT DISPOSABLE

## (undated) DEVICE — PAD BOVIE ADULT

## (undated) DEVICE — SOLUTION IRRI H2O BOTTLE 1000ML

## (undated) DEVICE — SLEEVE TROCAR ENDOPATH XCEL

## (undated) DEVICE — TRAY GENERAL LAPAROSCOPY

## (undated) DEVICE — TROCAR BLUNT TIP 12 X 100MM  C0R85

## (undated) DEVICE — GLOVE BIOGEL MICRO SURGEON PINK SZ 7

## (undated) DEVICE — TIP BOVIE ENT 2.75      E1455

## (undated) DEVICE — SUTURE VICRYL 0 UR-5 27 VCP376H

## (undated) DEVICE — SUTURE MONOCRYL 4-0 PS-2 27 MCP426H

## (undated) DEVICE — SYRINGE HYPODERMC LL 22G 1.5 ECLIPSE 30

## (undated) DEVICE — APPLIER CLIP  5MM

## (undated) DEVICE — SUTURE VICRYL #0 27 UR-6 VCP603H

## (undated) DEVICE — DRESSING TEGADERM 2 1/3 X 2 3/4 TD1002